# Patient Record
Sex: FEMALE | Race: WHITE | NOT HISPANIC OR LATINO
[De-identification: names, ages, dates, MRNs, and addresses within clinical notes are randomized per-mention and may not be internally consistent; named-entity substitution may affect disease eponyms.]

---

## 2019-10-23 PROBLEM — Z00.00 ENCOUNTER FOR PREVENTIVE HEALTH EXAMINATION: Status: ACTIVE | Noted: 2019-10-23

## 2019-10-29 ENCOUNTER — APPOINTMENT (OUTPATIENT)
Dept: ORTHOPEDIC SURGERY | Facility: CLINIC | Age: 69
End: 2019-10-29
Payer: MEDICARE

## 2019-10-29 VITALS
WEIGHT: 190 LBS | DIASTOLIC BLOOD PRESSURE: 79 MMHG | HEART RATE: 78 BPM | BODY MASS INDEX: 28.14 KG/M2 | HEIGHT: 69 IN | SYSTOLIC BLOOD PRESSURE: 128 MMHG

## 2019-10-29 DIAGNOSIS — M25.562 PAIN IN LEFT KNEE: ICD-10-CM

## 2019-10-29 DIAGNOSIS — M17.12 UNILATERAL PRIMARY OSTEOARTHRITIS, LEFT KNEE: ICD-10-CM

## 2019-10-29 PROCEDURE — 99203 OFFICE O/P NEW LOW 30 MIN: CPT

## 2019-10-29 PROCEDURE — 73562 X-RAY EXAM OF KNEE 3: CPT | Mod: LT

## 2019-10-29 PROCEDURE — 72170 X-RAY EXAM OF PELVIS: CPT | Mod: 59

## 2019-10-29 RX ORDER — ROSUVASTATIN CALCIUM 5 MG/1
TABLET, FILM COATED ORAL
Refills: 0 | Status: ACTIVE | COMMUNITY

## 2019-10-29 RX ORDER — LEVOTHYROXINE SODIUM 0.17 MG/1
TABLET ORAL
Refills: 0 | Status: ACTIVE | COMMUNITY

## 2020-02-19 ENCOUNTER — OUTPATIENT (OUTPATIENT)
Dept: OUTPATIENT SERVICES | Facility: HOSPITAL | Age: 70
LOS: 1 days | End: 2020-02-19
Payer: MEDICARE

## 2020-02-19 VITALS
WEIGHT: 187.39 LBS | RESPIRATION RATE: 15 BRPM | TEMPERATURE: 98 F | OXYGEN SATURATION: 99 % | DIASTOLIC BLOOD PRESSURE: 73 MMHG | HEIGHT: 68.5 IN | SYSTOLIC BLOOD PRESSURE: 109 MMHG | HEART RATE: 73 BPM

## 2020-02-19 DIAGNOSIS — Z90.89 ACQUIRED ABSENCE OF OTHER ORGANS: Chronic | ICD-10-CM

## 2020-02-19 DIAGNOSIS — Z01.818 ENCOUNTER FOR OTHER PREPROCEDURAL EXAMINATION: ICD-10-CM

## 2020-02-19 DIAGNOSIS — Z98.890 OTHER SPECIFIED POSTPROCEDURAL STATES: Chronic | ICD-10-CM

## 2020-02-19 DIAGNOSIS — M17.12 UNILATERAL PRIMARY OSTEOARTHRITIS, LEFT KNEE: ICD-10-CM

## 2020-02-19 DIAGNOSIS — M25.562 PAIN IN LEFT KNEE: ICD-10-CM

## 2020-02-19 LAB
ALBUMIN SERPL ELPH-MCNC: 3.8 G/DL — SIGNIFICANT CHANGE UP (ref 3.3–5)
ALP SERPL-CCNC: 63 U/L — SIGNIFICANT CHANGE UP (ref 30–120)
ALT FLD-CCNC: 21 U/L DA — SIGNIFICANT CHANGE UP (ref 10–60)
ANION GAP SERPL CALC-SCNC: 6 MMOL/L — SIGNIFICANT CHANGE UP (ref 5–17)
APTT BLD: 30.3 SEC — SIGNIFICANT CHANGE UP (ref 28.5–37)
AST SERPL-CCNC: 18 U/L — SIGNIFICANT CHANGE UP (ref 10–40)
BILIRUB SERPL-MCNC: 0.4 MG/DL — SIGNIFICANT CHANGE UP (ref 0.2–1.2)
BUN SERPL-MCNC: 16 MG/DL — SIGNIFICANT CHANGE UP (ref 7–23)
CALCIUM SERPL-MCNC: 9.1 MG/DL — SIGNIFICANT CHANGE UP (ref 8.4–10.5)
CHLORIDE SERPL-SCNC: 106 MMOL/L — SIGNIFICANT CHANGE UP (ref 96–108)
CO2 SERPL-SCNC: 30 MMOL/L — SIGNIFICANT CHANGE UP (ref 22–31)
CREAT SERPL-MCNC: 0.88 MG/DL — SIGNIFICANT CHANGE UP (ref 0.5–1.3)
GLUCOSE SERPL-MCNC: 100 MG/DL — HIGH (ref 70–99)
HCT VFR BLD CALC: 42 % — SIGNIFICANT CHANGE UP (ref 34.5–45)
HGB BLD-MCNC: 13.3 G/DL — SIGNIFICANT CHANGE UP (ref 11.5–15.5)
INR BLD: 0.98 RATIO — SIGNIFICANT CHANGE UP (ref 0.88–1.16)
MCHC RBC-ENTMCNC: 27.2 PG — SIGNIFICANT CHANGE UP (ref 27–34)
MCHC RBC-ENTMCNC: 31.7 GM/DL — LOW (ref 32–36)
MCV RBC AUTO: 85.9 FL — SIGNIFICANT CHANGE UP (ref 80–100)
MRSA PCR RESULT.: SIGNIFICANT CHANGE UP
NRBC # BLD: 0 /100 WBCS — SIGNIFICANT CHANGE UP (ref 0–0)
PLATELET # BLD AUTO: 242 K/UL — SIGNIFICANT CHANGE UP (ref 150–400)
POTASSIUM SERPL-MCNC: 4.6 MMOL/L — SIGNIFICANT CHANGE UP (ref 3.5–5.3)
POTASSIUM SERPL-SCNC: 4.6 MMOL/L — SIGNIFICANT CHANGE UP (ref 3.5–5.3)
PROT SERPL-MCNC: 7.6 G/DL — SIGNIFICANT CHANGE UP (ref 6–8.3)
PROTHROM AB SERPL-ACNC: 10.9 SEC — SIGNIFICANT CHANGE UP (ref 10–12.9)
RBC # BLD: 4.89 M/UL — SIGNIFICANT CHANGE UP (ref 3.8–5.2)
RBC # FLD: 14 % — SIGNIFICANT CHANGE UP (ref 10.3–14.5)
S AUREUS DNA NOSE QL NAA+PROBE: SIGNIFICANT CHANGE UP
SODIUM SERPL-SCNC: 142 MMOL/L — SIGNIFICANT CHANGE UP (ref 135–145)
WBC # BLD: 4.64 K/UL — SIGNIFICANT CHANGE UP (ref 3.8–10.5)
WBC # FLD AUTO: 4.64 K/UL — SIGNIFICANT CHANGE UP (ref 3.8–10.5)

## 2020-02-19 PROCEDURE — G0463: CPT

## 2020-02-19 PROCEDURE — 93005 ELECTROCARDIOGRAM TRACING: CPT

## 2020-02-19 PROCEDURE — 93010 ELECTROCARDIOGRAM REPORT: CPT

## 2020-02-19 NOTE — H&P PST ADULT - NSICDXPASTSURGICALHX_GEN_ALL_CORE_FT
PAST SURGICAL HISTORY:  S/P ear surgery left side age 23    S/P knee surgery meniscus 1 0n right, 2 on left    S/P tonsillectomy child

## 2020-02-19 NOTE — H&P PST ADULT - NSANTHOSAYNRD_GEN_A_CORE
No. TYSON screening performed.  STOP BANG Legend: 0-2 = LOW Risk; 3-4 = INTERMEDIATE Risk; 5-8 = HIGH Risk

## 2020-02-19 NOTE — H&P PST ADULT - HISTORY OF PRESENT ILLNESS
this is a 68 y/o female who has had left knee pain for about 10 months; has had gel and cortisone shots with relief for a period of time; tests show bone on bone; to have left knee replacement; takes ibuprofen for pain

## 2020-02-19 NOTE — H&P PST ADULT - NSICDXFAMILYHX_GEN_ALL_CORE_FT
FAMILY HISTORY:  FH: brain aneurysm, brother   FH: colon cancer, father   FH: heart failure, mother

## 2020-03-06 PROBLEM — E78.5 HYPERLIPIDEMIA, UNSPECIFIED: Chronic | Status: ACTIVE | Noted: 2020-02-19

## 2020-03-09 ENCOUNTER — RESULT REVIEW (OUTPATIENT)
Age: 70
End: 2020-03-09

## 2020-03-09 ENCOUNTER — TRANSCRIPTION ENCOUNTER (OUTPATIENT)
Age: 70
End: 2020-03-09

## 2020-03-09 ENCOUNTER — APPOINTMENT (OUTPATIENT)
Dept: ORTHOPEDIC SURGERY | Facility: HOSPITAL | Age: 70
End: 2020-03-09

## 2020-03-09 ENCOUNTER — INPATIENT (INPATIENT)
Facility: HOSPITAL | Age: 70
LOS: 1 days | Discharge: HOME | DRG: 470 | End: 2020-03-11
Attending: ORTHOPAEDIC SURGERY | Admitting: ORTHOPAEDIC SURGERY
Payer: MEDICARE

## 2020-03-09 VITALS
SYSTOLIC BLOOD PRESSURE: 124 MMHG | HEIGHT: 69 IN | RESPIRATION RATE: 13 BRPM | TEMPERATURE: 98 F | WEIGHT: 195.11 LBS | HEART RATE: 71 BPM | OXYGEN SATURATION: 100 % | DIASTOLIC BLOOD PRESSURE: 59 MMHG

## 2020-03-09 DIAGNOSIS — M17.12 UNILATERAL PRIMARY OSTEOARTHRITIS, LEFT KNEE: ICD-10-CM

## 2020-03-09 DIAGNOSIS — M25.562 PAIN IN LEFT KNEE: ICD-10-CM

## 2020-03-09 DIAGNOSIS — Z01.818 ENCOUNTER FOR OTHER PREPROCEDURAL EXAMINATION: ICD-10-CM

## 2020-03-09 DIAGNOSIS — Z90.89 ACQUIRED ABSENCE OF OTHER ORGANS: Chronic | ICD-10-CM

## 2020-03-09 DIAGNOSIS — Z98.890 OTHER SPECIFIED POSTPROCEDURAL STATES: Chronic | ICD-10-CM

## 2020-03-09 LAB
ANION GAP SERPL CALC-SCNC: 10 MMOL/L — SIGNIFICANT CHANGE UP (ref 5–17)
BUN SERPL-MCNC: 10 MG/DL — SIGNIFICANT CHANGE UP (ref 7–23)
CALCIUM SERPL-MCNC: 8.9 MG/DL — SIGNIFICANT CHANGE UP (ref 8.4–10.5)
CHLORIDE SERPL-SCNC: 107 MMOL/L — SIGNIFICANT CHANGE UP (ref 96–108)
CO2 SERPL-SCNC: 28 MMOL/L — SIGNIFICANT CHANGE UP (ref 22–31)
CREAT SERPL-MCNC: 1.01 MG/DL — SIGNIFICANT CHANGE UP (ref 0.5–1.3)
GLUCOSE SERPL-MCNC: 172 MG/DL — HIGH (ref 70–99)
HCT VFR BLD CALC: 38.6 % — SIGNIFICANT CHANGE UP (ref 34.5–45)
HGB BLD-MCNC: 12.2 G/DL — SIGNIFICANT CHANGE UP (ref 11.5–15.5)
MCHC RBC-ENTMCNC: 27.1 PG — SIGNIFICANT CHANGE UP (ref 27–34)
MCHC RBC-ENTMCNC: 31.6 GM/DL — LOW (ref 32–36)
MCV RBC AUTO: 85.8 FL — SIGNIFICANT CHANGE UP (ref 80–100)
NRBC # BLD: 0 /100 WBCS — SIGNIFICANT CHANGE UP (ref 0–0)
PLATELET # BLD AUTO: 227 K/UL — SIGNIFICANT CHANGE UP (ref 150–400)
POTASSIUM SERPL-MCNC: 3.9 MMOL/L — SIGNIFICANT CHANGE UP (ref 3.5–5.3)
POTASSIUM SERPL-SCNC: 3.9 MMOL/L — SIGNIFICANT CHANGE UP (ref 3.5–5.3)
RBC # BLD: 4.5 M/UL — SIGNIFICANT CHANGE UP (ref 3.8–5.2)
RBC # FLD: 13.9 % — SIGNIFICANT CHANGE UP (ref 10.3–14.5)
SODIUM SERPL-SCNC: 145 MMOL/L — SIGNIFICANT CHANGE UP (ref 135–145)
WBC # BLD: 8.79 K/UL — SIGNIFICANT CHANGE UP (ref 3.8–10.5)
WBC # FLD AUTO: 8.79 K/UL — SIGNIFICANT CHANGE UP (ref 3.8–10.5)

## 2020-03-09 PROCEDURE — 27447 TOTAL KNEE ARTHROPLASTY: CPT | Mod: LT

## 2020-03-09 PROCEDURE — 27447 TOTAL KNEE ARTHROPLASTY: CPT | Mod: AS,LT

## 2020-03-09 PROCEDURE — 88305 TISSUE EXAM BY PATHOLOGIST: CPT | Mod: 26

## 2020-03-09 PROCEDURE — 99223 1ST HOSP IP/OBS HIGH 75: CPT

## 2020-03-09 PROCEDURE — 73562 X-RAY EXAM OF KNEE 3: CPT | Mod: 26,LT

## 2020-03-09 PROCEDURE — 88311 DECALCIFY TISSUE: CPT | Mod: 26

## 2020-03-09 RX ORDER — ACETAMINOPHEN 500 MG
1000 TABLET ORAL ONCE
Refills: 0 | Status: COMPLETED | OUTPATIENT
Start: 2020-03-09 | End: 2020-03-09

## 2020-03-09 RX ORDER — TRANEXAMIC ACID 100 MG/ML
1000 INJECTION, SOLUTION INTRAVENOUS ONCE
Refills: 0 | Status: COMPLETED | OUTPATIENT
Start: 2020-03-09 | End: 2020-03-09

## 2020-03-09 RX ORDER — SODIUM CHLORIDE 9 MG/ML
1000 INJECTION, SOLUTION INTRAVENOUS
Refills: 0 | Status: DISCONTINUED | OUTPATIENT
Start: 2020-03-09 | End: 2020-03-09

## 2020-03-09 RX ORDER — OXYCODONE HYDROCHLORIDE 5 MG/1
5 TABLET ORAL
Refills: 0 | Status: DISCONTINUED | OUTPATIENT
Start: 2020-03-09 | End: 2020-03-10

## 2020-03-09 RX ORDER — ACETAMINOPHEN 500 MG
1000 TABLET ORAL EVERY 8 HOURS
Refills: 0 | Status: DISCONTINUED | OUTPATIENT
Start: 2020-03-10 | End: 2020-03-11

## 2020-03-09 RX ORDER — PANTOPRAZOLE SODIUM 20 MG/1
40 TABLET, DELAYED RELEASE ORAL
Refills: 0 | Status: DISCONTINUED | OUTPATIENT
Start: 2020-03-09 | End: 2020-03-11

## 2020-03-09 RX ORDER — ONDANSETRON 8 MG/1
4 TABLET, FILM COATED ORAL ONCE
Refills: 0 | Status: COMPLETED | OUTPATIENT
Start: 2020-03-09 | End: 2020-03-10

## 2020-03-09 RX ORDER — HYDROMORPHONE HYDROCHLORIDE 2 MG/ML
0.5 INJECTION INTRAMUSCULAR; INTRAVENOUS; SUBCUTANEOUS
Refills: 0 | Status: DISCONTINUED | OUTPATIENT
Start: 2020-03-09 | End: 2020-03-09

## 2020-03-09 RX ORDER — CHLORHEXIDINE GLUCONATE 213 G/1000ML
1 SOLUTION TOPICAL ONCE
Refills: 0 | Status: COMPLETED | OUTPATIENT
Start: 2020-03-09 | End: 2020-03-09

## 2020-03-09 RX ORDER — CEFAZOLIN SODIUM 1 G
2000 VIAL (EA) INJECTION EVERY 8 HOURS
Refills: 0 | Status: COMPLETED | OUTPATIENT
Start: 2020-03-09 | End: 2020-03-09

## 2020-03-09 RX ORDER — ASPIRIN/CALCIUM CARB/MAGNESIUM 324 MG
81 TABLET ORAL EVERY 12 HOURS
Refills: 0 | Status: DISCONTINUED | OUTPATIENT
Start: 2020-03-10 | End: 2020-03-11

## 2020-03-09 RX ORDER — PANTOPRAZOLE SODIUM 20 MG/1
1 TABLET, DELAYED RELEASE ORAL
Qty: 30 | Refills: 1
Start: 2020-03-09 | End: 2020-05-07

## 2020-03-09 RX ORDER — OXYCODONE HYDROCHLORIDE 5 MG/1
10 TABLET ORAL
Refills: 0 | Status: DISCONTINUED | OUTPATIENT
Start: 2020-03-09 | End: 2020-03-10

## 2020-03-09 RX ORDER — CEFAZOLIN SODIUM 1 G
2000 VIAL (EA) INJECTION ONCE
Refills: 0 | Status: COMPLETED | OUTPATIENT
Start: 2020-03-09 | End: 2020-03-09

## 2020-03-09 RX ORDER — CELECOXIB 200 MG/1
200 CAPSULE ORAL EVERY 12 HOURS
Refills: 0 | Status: DISCONTINUED | OUTPATIENT
Start: 2020-03-10 | End: 2020-03-11

## 2020-03-09 RX ORDER — HYDROMORPHONE HYDROCHLORIDE 2 MG/ML
0.5 INJECTION INTRAMUSCULAR; INTRAVENOUS; SUBCUTANEOUS
Refills: 0 | Status: DISCONTINUED | OUTPATIENT
Start: 2020-03-09 | End: 2020-03-11

## 2020-03-09 RX ORDER — ACETAMINOPHEN 500 MG
1000 TABLET ORAL EVERY 6 HOURS
Refills: 0 | Status: COMPLETED | OUTPATIENT
Start: 2020-03-09 | End: 2020-03-10

## 2020-03-09 RX ORDER — SENNA PLUS 8.6 MG/1
2 TABLET ORAL AT BEDTIME
Refills: 0 | Status: DISCONTINUED | OUTPATIENT
Start: 2020-03-09 | End: 2020-03-11

## 2020-03-09 RX ORDER — CELECOXIB 200 MG/1
1 CAPSULE ORAL
Qty: 60 | Refills: 0
Start: 2020-03-09 | End: 2020-04-07

## 2020-03-09 RX ORDER — CEFAZOLIN SODIUM 1 G
2000 VIAL (EA) INJECTION ONCE
Refills: 0 | Status: COMPLETED | OUTPATIENT
Start: 2020-03-10 | End: 2020-03-10

## 2020-03-09 RX ORDER — MAGNESIUM HYDROXIDE 400 MG/1
30 TABLET, CHEWABLE ORAL DAILY
Refills: 0 | Status: DISCONTINUED | OUTPATIENT
Start: 2020-03-09 | End: 2020-03-11

## 2020-03-09 RX ORDER — POLYETHYLENE GLYCOL 3350 17 G/17G
17 POWDER, FOR SOLUTION ORAL AT BEDTIME
Refills: 0 | Status: DISCONTINUED | OUTPATIENT
Start: 2020-03-09 | End: 2020-03-11

## 2020-03-09 RX ORDER — SODIUM CHLORIDE 9 MG/ML
1000 INJECTION, SOLUTION INTRAVENOUS
Refills: 0 | Status: DISCONTINUED | OUTPATIENT
Start: 2020-03-09 | End: 2020-03-10

## 2020-03-09 RX ORDER — ASPIRIN/CALCIUM CARB/MAGNESIUM 324 MG
1 TABLET ORAL
Qty: 82 | Refills: 0
Start: 2020-03-09 | End: 2020-04-18

## 2020-03-09 RX ORDER — ONDANSETRON 8 MG/1
4 TABLET, FILM COATED ORAL ONCE
Refills: 0 | Status: DISCONTINUED | OUTPATIENT
Start: 2020-03-09 | End: 2020-03-09

## 2020-03-09 RX ORDER — APREPITANT 80 MG/1
40 CAPSULE ORAL ONCE
Refills: 0 | Status: COMPLETED | OUTPATIENT
Start: 2020-03-09 | End: 2020-03-09

## 2020-03-09 RX ORDER — ACETAMINOPHEN 500 MG
2 TABLET ORAL
Qty: 0 | Refills: 0 | DISCHARGE
Start: 2020-03-09 | End: 2020-03-23

## 2020-03-09 RX ADMIN — Medication 1000 MILLIGRAM(S): at 21:40

## 2020-03-09 RX ADMIN — OXYCODONE HYDROCHLORIDE 5 MILLIGRAM(S): 5 TABLET ORAL at 19:35

## 2020-03-09 RX ADMIN — SODIUM CHLORIDE 125 MILLILITER(S): 9 INJECTION, SOLUTION INTRAVENOUS at 21:02

## 2020-03-09 RX ADMIN — Medication 400 MILLIGRAM(S): at 21:01

## 2020-03-09 RX ADMIN — Medication 400 MILLIGRAM(S): at 15:55

## 2020-03-09 RX ADMIN — SODIUM CHLORIDE 75 MILLILITER(S): 9 INJECTION, SOLUTION INTRAVENOUS at 11:30

## 2020-03-09 RX ADMIN — APREPITANT 40 MILLIGRAM(S): 80 CAPSULE ORAL at 07:07

## 2020-03-09 RX ADMIN — OXYCODONE HYDROCHLORIDE 5 MILLIGRAM(S): 5 TABLET ORAL at 20:05

## 2020-03-09 RX ADMIN — Medication 1000 MILLIGRAM(S): at 16:30

## 2020-03-09 RX ADMIN — Medication 100 MILLIGRAM(S): at 17:10

## 2020-03-09 RX ADMIN — SODIUM CHLORIDE 125 MILLILITER(S): 9 INJECTION, SOLUTION INTRAVENOUS at 15:54

## 2020-03-09 RX ADMIN — CHLORHEXIDINE GLUCONATE 1 APPLICATION(S): 213 SOLUTION TOPICAL at 07:07

## 2020-03-09 NOTE — PATIENT PROFILE ADULT - ...
OK to DC diflucan. OK to take tylenol as needed as long as it is no more than recommended on bottle. Hold off on triamcinolone cream for now (would only help if there was a rash). 09-Mar-2020 06:55:13

## 2020-03-09 NOTE — DISCHARGE NOTE PROVIDER - NSDCACTIVITY_GEN_ALL_CORE
Stairs allowed/Walking - Indoors allowed/Walking - Outdoors allowed/No heavy lifting/straining/Do not drive or operate machinery/Showering allowed

## 2020-03-09 NOTE — DISCHARGE NOTE PROVIDER - PROVIDER TOKENS
PROVIDER:[TOKEN:[[id number]],SCHEDULEDAPPT:[03/23/2020],SCHEDULEDAPPTTIME:[02:20 PM],ESTABLISHEDPATIENT:[T]]

## 2020-03-09 NOTE — DISCHARGE NOTE PROVIDER - NSDCCPCAREPLAN_GEN_ALL_CORE_FT
PRINCIPAL DISCHARGE DIAGNOSIS  Diagnosis: Primary osteoarthritis of left knee  Assessment and Plan of Treatment: For your total knee replacement:  Physical Therapy/Occupational Therapy for: ambulation, transfers, stairs, ADL's (activities of daily living), range of motion exercises, and isometrics  -Activity  • Weight Bearing as tolerated with rolling walker.  • Take short, frequent walks increasing the distance that you walk each day as tolerated.  • Change your position every hour to decrease pain and stiffness.  • Continue the exercises taught to you by your physical therapist.  • No driving until cleared by the doctor.  • No tub baths, hot tubs, or swimming pools until instructed by your doctor.  • Do not squat down on the floor.  • Do not kneel or twist your knee.  • Range of Motion Goals: Flexion= 120 degrees, Extension = 0 degrees  Keep incision clean and dry. May shower post-op if no drainage from incision.  Do not scrub incision site. Pat dry after shower.  Prineo removal 2 weeks after surgery at Surgeon's office.

## 2020-03-09 NOTE — DISCHARGE NOTE PROVIDER - NSDCCPTREATMENT_GEN_ALL_CORE_FT
PRINCIPAL PROCEDURE  Procedure: Left total knee replacement  Findings and Treatment: severe DJD left knee

## 2020-03-09 NOTE — PHYSICAL THERAPY INITIAL EVALUATION ADULT - GAIT TRAINING, PT EVAL
Pt will ambulate 150 feet with RW independently in 3-4 sesssions . Negotiate 15 steps with 1 handrail and cane independently WBAT  LE in 3-4 sessions

## 2020-03-09 NOTE — CONSULT NOTE ADULT - ASSESSMENT
Aftercare following surgery    pain meds dilaudid and oxycodone. Monitor for respiratory depression and lethargy.  PT/OT.  DVT prophylaxis.  DVT ppx: [ x]ASA81 [ ] KEQ007 [ ] Lovenox [ ] Coumadin   [ ] Eliquis [  ] Heparin  Dispo:     Home [ ]     Acute Rehab [ ]     ABBEY [ ]     TBD [x ]    Dyslipidemia  simvastatin.    Hypothyroidism  synthroid. Aftercare following left TKR 3/9    pain meds dilaudid and oxycodone. Monitor for respiratory depression and lethargy.  PT/OT.  DVT prophylaxis.  DVT ppx: [ x]ASA81 [ ] EQC554 [ ] Lovenox [ ] Coumadin   [ ] Eliquis [  ] Heparin  Dispo:     Home [ x]     Acute Rehab [ ]     ABBEY [ ]     TBD [ ]    Dyslipidemia  simvastatin.    Hypothyroidism  synthroid.    Plan of care was discuss with patient, all questions were answered, seems understand and in agreement.

## 2020-03-09 NOTE — BRIEF OPERATIVE NOTE - NSICDXBRIEFPREOP_GEN_ALL_CORE_FT
PRE-OP DIAGNOSIS:  Primary localized osteoarthritis of left knee 09-Mar-2020 11:13:56  eHrnando Beverly

## 2020-03-09 NOTE — DISCHARGE NOTE PROVIDER - CARE PROVIDER_API CALL
Alexei Fuentes)  Orthopaedic Surgery  [address], [address]  [place] [address]  Phone: [phone]  Fax: [phone]  Established Patient  Scheduled Appointment: 03/23/2020 02:20 PM

## 2020-03-09 NOTE — DISCHARGE NOTE PROVIDER - NSDCFUSCHEDAPPT_GEN_ALL_CORE_FT
BELLA NOLEN ; 03/23/2020 ; [address]  BELLA NOLEN ; 05/05/2020 ; [address] BELLA NOLEN ; 03/23/2020 ; [address]  BELLA NOLEN ; 05/05/2020 ; [address] BELLA NOLEN ; 03/23/2020 ; [address]  BELLA NOLEN ; 05/05/2020 ; [address]

## 2020-03-09 NOTE — PROGRESS NOTE ADULT - SUBJECTIVE AND OBJECTIVE BOX
Ortho PA Post Op Check    Procedure: Left TKR  Surgeon: MD Alfredo    Pt comfortable without complaints, pain controlled 3-4/10 on interval Rx; Denies CP, SOB, N/V, numbness/tingling   Tolerated PO fluids well. Spinal emerged. Ambulation attempted earlier with PT, limited due to block effect.     Pain Rx:  acetaminophen  IVPB .. 1000 milliGRAM(s) IV Intermittent every 6 hours  HYDROmorphone  Injectable 0.5 milliGRAM(s) IV Push every 3 hours PRN  oxyCODONE    IR 5 milliGRAM(s) Oral every 3 hours PRN  oxyCODONE    IR 10 milliGRAM(s) Oral every 3 hours PRN      General Exam:  Vital Signs Last 24 Hrs  T(C): 36.3 (03-09-20 @ 11:05), Max: 36.3 (03-09-20 @ 11:05)  T(F): 97.4 (03-09-20 @ 11:05), Max: 97.4 (03-09-20 @ 11:05)  HR: 76 (03-09-20 @ 13:35) (65 - 76)  BP: 121/73 (03-09-20 @ 13:35) (112/52 - 128/57)  BP(mean): --  RR: 18 (03-09-20 @ 13:35) (10 - 23)  SpO2: 99% (03-09-20 @ 13:35) (99% - 100%)    General: Pt Alert and oriented, NAD, controlled pain.  Ext(Knee): Left Knee Carmichael Dressing clean, dry, & intact; Not constricting at Proximal & Distal ends  Neuro/Vasc: Feet toes warm, pink. DP = 2+. No calf tenderness bilat. Has sensation over feet & toes bilat. Full AROM bilat feet & toes. EHL = 5/5  Urine Out [11P-7A] - voided  VTEP: On Venodynes Bilat; BID Ecotrin to start in AM  Hospitalist input noted    A/P: 69yFemale POD#0 s/p Left TKR   - Stable from surgical standpoint  - Pain Control stable  - DVT ppx: Ordered  - Post op abx: Ordered   - Post Op Labs pending draw; Ortho team/Hospitalist to F/U  - Weight bearing status: Fwb

## 2020-03-09 NOTE — PRE-OP CHECKLIST - BLOOD AVAILABLE
Problem: Diabetes, Type 2 (Adult)  Goal: Signs and Symptoms of Listed Potential Problems Will be Absent, Minimized or Managed (Diabetes, Type 2)  Signs and symptoms of listed potential problems will be absent, minimized or managed by discharge/transition of care (reference Diabetes, Type 2 (Adult) CPG).   Accuchecks continue every shift.     Problem: Patient Care Overview  Goal: Plan of Care Review  Outcome: Ongoing (interventions implemented as appropriate)  Plan of care reviewed with patient and he agrees with the plan of care. Telemetry monitoring continues. SCDs in use. Bishop intact. Catheter care provided. Acetaminophen effective for c/o headache.    Problem: Fall Risk (Adult)  Goal: Absence of Falls  Patient will demonstrate the desired outcomes by discharge/transition of care.   Outcome: Ongoing (interventions implemented as appropriate)  Fall precautions maintained. Bed alarm engaged at all times. Family at bedside.     Problem: Pressure Ulcer Risk (Ronaldo Scale) (Adult,Obstetrics,Pediatric)  Goal: Skin Integrity  Patient will demonstrate the desired outcomes by discharge/transition of care.   Outcome: Ongoing (interventions implemented as appropriate)  Frequent weight shifts encouraged.     Problem: Urinary Tract Infection (Adult)  Goal: Signs and Symptoms of Listed Potential Problems Will be Absent, Minimized or Managed (Urinary Tract Infection)  Signs and symptoms of listed potential problems will be absent, minimized or managed by discharge/transition of care (reference Urinary Tract Infection (Adult) CPG).   Afebrile. Denies dsyuria. Wife  At bedside.        n/a

## 2020-03-09 NOTE — PHYSICAL THERAPY INITIAL EVALUATION ADULT - ADDITIONAL COMMENTS
Pt lives in house with 4 steps to enter no steps inside. Owns a cane. Pt states  will be caregiver for pt upon d/c to home.

## 2020-03-09 NOTE — CONSULT NOTE ADULT - SUBJECTIVE AND OBJECTIVE BOX
Patient is a 69y old  Female who presents with a chief complaint of   this is a 70 y/o female who has had left knee pain for about 10 months; has had gel and cortisone shots with relief for a period of time; tests show bone on bone; to have left knee replacement; takes ibuprofen for pain  HPI:    PAST MEDICAL & SURGICAL HISTORY:  Osteoarthritis  Hyperlipemia  Hypothyroid  S/P ear surgery: left side age 23  S/P tonsillectomy: child  S/P knee surgery: meniscus 1 0n right, 2 on left    MEDICATIONS  (STANDING):  lactated ringers. 1000 milliLiter(s) (75 mL/Hr) IV Continuous <Continuous>    MEDICATIONS  (PRN):  HYDROmorphone  Injectable 0.5 milliGRAM(s) IV Push every 10 minutes PRN Moderate Pain (4 - 6)  ondansetron Injectable 4 milliGRAM(s) IV Push once PRN Nausea and/or Vomiting      Allergies    No Known Allergies    Intolerances    codeine (Nausea)    SOCIAL HISTORY:  Smoker:  YES / NO        PACK YEARS:                         WHEN QUIT?  ETOH use:  YES / NO               FREQUENCY / QUANTITY:  Ilicit Drug use:  YES / NO  Occupation:  Assisted device use (Cane / Walker):  Live with:      FAMILY HISTORY:  FH: brain aneurysm: brother   FH: heart failure: mother   FH: colon cancer: father       Vital Signs Last 24 Hrs  T(C): 36.3 (09 Mar 2020 11:05), Max: 36.8 (09 Mar 2020 07:09)  T(F): 97.4 (09 Mar 2020 11:05), Max: 98.3 (09 Mar 2020 07:09)  HR: 66 (09 Mar 2020 11:35) (66 - 73)  BP: 117/52 (09 Mar 2020 11:35) (113/62 - 124/59)  BP(mean): --  RR: 18 (09 Mar 2020 11:35) (13 - 23)  SpO2: 100% (09 Mar 2020 11:35) (99% - 100%) Patient is a 69y old  Female who presents with a chief complaint of   this is a 70 y/o female who has had left knee pain for about 10 months; has had gel and cortisone shots with relief for a period of time; tests show bone on bone; to have left knee replacement; takes ibuprofen for pain  HPI:    Patient is seen and examined.  Pain is controlled.    PAST MEDICAL & SURGICAL HISTORY:  Osteoarthritis  Hyperlipemia  Hypothyroid  S/P ear surgery: left side age 23  S/P tonsillectomy: child  S/P knee surgery: meniscus 1 0n right, 2 on left    MEDICATIONS  (STANDING):  lactated ringers. 1000 milliLiter(s) (75 mL/Hr) IV Continuous <Continuous>    MEDICATIONS  (PRN):  HYDROmorphone  Injectable 0.5 milliGRAM(s) IV Push every 10 minutes PRN Moderate Pain (4 - 6)  ondansetron Injectable 4 milliGRAM(s) IV Push once PRN Nausea and/or Vomiting      Allergies    No Known Allergies    Intolerances    codeine (Nausea)    SOCIAL HISTORY:  Smoker:  NO        PACK YEARS:                         WHEN QUIT?  ETOH use:  YES /social               FREQUENCY / QUANTITY:  Ilicit Drug use:   NO        FAMILY HISTORY:  FH: brain aneurysm: brother   FH: heart failure: mother   FH: colon cancer: father       Vital Signs Last 24 Hrs  T(C): 36.3 (09 Mar 2020 11:05), Max: 36.8 (09 Mar 2020 07:09)  T(F): 97.4 (09 Mar 2020 11:05), Max: 98.3 (09 Mar 2020 07:09)  HR: 66 (09 Mar 2020 11:35) (66 - 73)  BP: 117/52 (09 Mar 2020 11:35) (113/62 - 124/59)  BP(mean): --  RR: 18 (09 Mar 2020 11:35) (13 - 23)  SpO2: 100% (09 Mar 2020 11:35) (99% - 100%)

## 2020-03-09 NOTE — DISCHARGE NOTE PROVIDER - NSDCMRMEDTOKEN_GEN_ALL_CORE_FT
acetaminophen 500 mg oral tablet: 2 tab(s) orally every 8 hours  Advil 200 mg oral tablet: 3 tab(s) orally every 6 hours, As Needed  aspirin 81 mg oral delayed release tablet: 1 tab orally every 12 hours. Take 2 hours before Celebrex  celecoxib 200 mg oral capsule: 1 cap orally every 12 hours. Take 2 hours after   Aspirin  Crestor 5 mg oral tablet: 1 tab(s) orally once a day  pantoprazole 40 mg oral delayed release tablet: 1 tab orally once a day (before a meal)   Synthroid 25 mcg (0.025 mg) oral tablet: 1 tab(s) orally once a day 3:1 Commode: Dx: s/p Left TKR  acetaminophen 500 mg oral tablet: 2 tab(s) orally every 8 hours  aspirin 81 mg oral delayed release tablet: 1 tab orally every 12 hours. Take 2 hours before Celebrex  celecoxib 200 mg oral capsule: 1 cap orally every 12 hours. Take 2 hours after   Aspirin  Crestor 5 mg oral tablet: 1 tab(s) orally once a day  pantoprazole 40 mg oral delayed release tablet: 1 tab orally once a day (before a meal)   polyethylene glycol 3350 oral powder for reconstitution: 17 gram(s) orally once a day (at bedtime)  Rolling Walker with 5 inch wheels: Dx: s/p Left TKR  senna oral tablet: 2 tab(s) orally once a day (at bedtime)  Synthroid 25 mcg (0.025 mg) oral tablet: 1 tab(s) orally once a day  traMADol 50 mg oral tablet: 1 tab orally every 4 hours, As needed, Mod Pain; 2 tabs orally every 6 hours as needed for severe pain MDD:8   ref #[id number] 3:1 Commode: Dx: s/p Left TKR  acetaminophen 500 mg oral tablet: 2 tab(s) orally every 8 hours  aspirin 81 mg oral delayed release tablet: 1 tab orally every 12 hours. Take 2 hours before Celebrex  celecoxib 200 mg oral capsule: 1 cap orally every 12 hours. Take 2 hours after   Aspirin  Crestor 5 mg oral tablet: 1 tab(s) orally once a day  pantoprazole 40 mg oral delayed release tablet: 1 tab orally once a day (before a meal)   polyethylene glycol 3350 oral powder for reconstitution: 17 gram(s) orally once a day (at bedtime)  Rolling Walker with 5 inch wheels: Dx: s/p Left TKR  senna oral tablet: 2 tab(s) orally once a day (at bedtime)  Synthroid 25 mcg (0.025 mg) oral tablet: 1 tab(s) orally once a day  traMADol 50 mg oral tablet: 1 tab orally every 4 hours, As needed, Mod Pain; 2 tabs orally every 6 hours as needed for severe pain MDD:8   ref #[id number]

## 2020-03-09 NOTE — DISCHARGE NOTE PROVIDER - HOSPITAL COURSE
This patient was admitted to [facility] with a history of severe degenerative joint disease of the left knee.  Patient went to Pre-Surgical Testing at [facility] and was medically cleared to undergo elective procedure. Patient underwent Left TKR by Dr. Alexei Fuentes on 3/9/20. Procedure was well tolerated.  No operative or concepcion-operative complications arose during patients hospital course.  Patient received antibiotic according to SCIP guidelines for infection prevention.  Aspirin 81mg q 12h was given for DVT prophylaxis, in addition to the use of SCDs.  Anesthesia, Medical Hospitalist, Physical Therapy and Occupational Therapy were consulted. Patient is stable for discharge with a good prognosis.  Appropriate discharge instructions and medications are provided in this document.

## 2020-03-09 NOTE — PHYSICAL THERAPY INITIAL EVALUATION ADULT - ACTIVE RANGE OF MOTION EXAMINATION, REHAB EVAL
ayaka. upper extremity Active ROM was WNL (within normal limits)/deficits as listed below/Left LE knee flexion 0-50 degrees , left ankle foot WNL./RLE Active ROM was WNL (within normal limits)

## 2020-03-10 ENCOUNTER — TRANSCRIPTION ENCOUNTER (OUTPATIENT)
Age: 70
End: 2020-03-10

## 2020-03-10 LAB
ANION GAP SERPL CALC-SCNC: 7 MMOL/L — SIGNIFICANT CHANGE UP (ref 5–17)
BUN SERPL-MCNC: 10 MG/DL — SIGNIFICANT CHANGE UP (ref 7–23)
CALCIUM SERPL-MCNC: 8.9 MG/DL — SIGNIFICANT CHANGE UP (ref 8.4–10.5)
CHLORIDE SERPL-SCNC: 106 MMOL/L — SIGNIFICANT CHANGE UP (ref 96–108)
CO2 SERPL-SCNC: 28 MMOL/L — SIGNIFICANT CHANGE UP (ref 22–31)
CREAT SERPL-MCNC: 0.84 MG/DL — SIGNIFICANT CHANGE UP (ref 0.5–1.3)
GLUCOSE SERPL-MCNC: 109 MG/DL — HIGH (ref 70–99)
HCT VFR BLD CALC: 35.5 % — SIGNIFICANT CHANGE UP (ref 34.5–45)
HGB BLD-MCNC: 11.5 G/DL — SIGNIFICANT CHANGE UP (ref 11.5–15.5)
MCHC RBC-ENTMCNC: 27 PG — SIGNIFICANT CHANGE UP (ref 27–34)
MCHC RBC-ENTMCNC: 32.4 GM/DL — SIGNIFICANT CHANGE UP (ref 32–36)
MCV RBC AUTO: 83.3 FL — SIGNIFICANT CHANGE UP (ref 80–100)
NRBC # BLD: 0 /100 WBCS — SIGNIFICANT CHANGE UP (ref 0–0)
PLATELET # BLD AUTO: 227 K/UL — SIGNIFICANT CHANGE UP (ref 150–400)
POTASSIUM SERPL-MCNC: 4.1 MMOL/L — SIGNIFICANT CHANGE UP (ref 3.5–5.3)
POTASSIUM SERPL-SCNC: 4.1 MMOL/L — SIGNIFICANT CHANGE UP (ref 3.5–5.3)
RBC # BLD: 4.26 M/UL — SIGNIFICANT CHANGE UP (ref 3.8–5.2)
RBC # FLD: 14.2 % — SIGNIFICANT CHANGE UP (ref 10.3–14.5)
SODIUM SERPL-SCNC: 141 MMOL/L — SIGNIFICANT CHANGE UP (ref 135–145)
WBC # BLD: 8.78 K/UL — SIGNIFICANT CHANGE UP (ref 3.8–10.5)
WBC # FLD AUTO: 8.78 K/UL — SIGNIFICANT CHANGE UP (ref 3.8–10.5)

## 2020-03-10 PROCEDURE — 99233 SBSQ HOSP IP/OBS HIGH 50: CPT

## 2020-03-10 RX ORDER — TRAMADOL HYDROCHLORIDE 50 MG/1
50 TABLET ORAL EVERY 4 HOURS
Refills: 0 | Status: DISCONTINUED | OUTPATIENT
Start: 2020-03-10 | End: 2020-03-11

## 2020-03-10 RX ORDER — ATORVASTATIN CALCIUM 80 MG/1
20 TABLET, FILM COATED ORAL AT BEDTIME
Refills: 0 | Status: DISCONTINUED | OUTPATIENT
Start: 2020-03-10 | End: 2020-03-11

## 2020-03-10 RX ORDER — LEVOTHYROXINE SODIUM 125 MCG
25 TABLET ORAL DAILY
Refills: 0 | Status: DISCONTINUED | OUTPATIENT
Start: 2020-03-10 | End: 2020-03-11

## 2020-03-10 RX ORDER — TRAMADOL HYDROCHLORIDE 50 MG/1
100 TABLET ORAL EVERY 6 HOURS
Refills: 0 | Status: DISCONTINUED | OUTPATIENT
Start: 2020-03-10 | End: 2020-03-11

## 2020-03-10 RX ADMIN — OXYCODONE HYDROCHLORIDE 10 MILLIGRAM(S): 5 TABLET ORAL at 04:49

## 2020-03-10 RX ADMIN — OXYCODONE HYDROCHLORIDE 5 MILLIGRAM(S): 5 TABLET ORAL at 08:54

## 2020-03-10 RX ADMIN — OXYCODONE HYDROCHLORIDE 10 MILLIGRAM(S): 5 TABLET ORAL at 01:30

## 2020-03-10 RX ADMIN — Medication 25 MICROGRAM(S): at 05:52

## 2020-03-10 RX ADMIN — Medication 81 MILLIGRAM(S): at 05:52

## 2020-03-10 RX ADMIN — Medication 1000 MILLIGRAM(S): at 18:00

## 2020-03-10 RX ADMIN — TRAMADOL HYDROCHLORIDE 50 MILLIGRAM(S): 50 TABLET ORAL at 18:56

## 2020-03-10 RX ADMIN — Medication 1000 MILLIGRAM(S): at 03:48

## 2020-03-10 RX ADMIN — OXYCODONE HYDROCHLORIDE 10 MILLIGRAM(S): 5 TABLET ORAL at 00:59

## 2020-03-10 RX ADMIN — OXYCODONE HYDROCHLORIDE 10 MILLIGRAM(S): 5 TABLET ORAL at 04:19

## 2020-03-10 RX ADMIN — PANTOPRAZOLE SODIUM 40 MILLIGRAM(S): 20 TABLET, DELAYED RELEASE ORAL at 05:52

## 2020-03-10 RX ADMIN — OXYCODONE HYDROCHLORIDE 5 MILLIGRAM(S): 5 TABLET ORAL at 09:25

## 2020-03-10 RX ADMIN — Medication 81 MILLIGRAM(S): at 17:27

## 2020-03-10 RX ADMIN — Medication 100 MILLIGRAM(S): at 00:43

## 2020-03-10 RX ADMIN — SENNA PLUS 2 TABLET(S): 8.6 TABLET ORAL at 21:06

## 2020-03-10 RX ADMIN — CELECOXIB 200 MILLIGRAM(S): 200 CAPSULE ORAL at 21:06

## 2020-03-10 RX ADMIN — Medication 1000 MILLIGRAM(S): at 10:45

## 2020-03-10 RX ADMIN — CELECOXIB 200 MILLIGRAM(S): 200 CAPSULE ORAL at 09:25

## 2020-03-10 RX ADMIN — CELECOXIB 200 MILLIGRAM(S): 200 CAPSULE ORAL at 21:40

## 2020-03-10 RX ADMIN — ONDANSETRON 4 MILLIGRAM(S): 8 TABLET, FILM COATED ORAL at 13:54

## 2020-03-10 RX ADMIN — TRAMADOL HYDROCHLORIDE 50 MILLIGRAM(S): 50 TABLET ORAL at 19:26

## 2020-03-10 RX ADMIN — SODIUM CHLORIDE 125 MILLILITER(S): 9 INJECTION, SOLUTION INTRAVENOUS at 05:52

## 2020-03-10 RX ADMIN — Medication 1000 MILLIGRAM(S): at 17:27

## 2020-03-10 RX ADMIN — CELECOXIB 200 MILLIGRAM(S): 200 CAPSULE ORAL at 08:54

## 2020-03-10 RX ADMIN — Medication 400 MILLIGRAM(S): at 03:11

## 2020-03-10 RX ADMIN — TRAMADOL HYDROCHLORIDE 50 MILLIGRAM(S): 50 TABLET ORAL at 23:32

## 2020-03-10 RX ADMIN — Medication 1000 MILLIGRAM(S): at 11:14

## 2020-03-10 RX ADMIN — ATORVASTATIN CALCIUM 20 MILLIGRAM(S): 80 TABLET, FILM COATED ORAL at 21:06

## 2020-03-10 NOTE — DISCHARGE NOTE NURSING/CASE MANAGEMENT/SOCIAL WORK - NSSCNAMETXT_GEN_ALL_CORE
[facility]   Please contact the home care agency if you have not heard from them by 12 noon on the day after your hospital discharge.   Nurse to visit the day after hospital discharge; Rehabilitation Therapists to follow

## 2020-03-10 NOTE — PROGRESS NOTE ADULT - SUBJECTIVE AND OBJECTIVE BOX
Ortho PA - Post Op Check - S/P Left TKR          Pt alert and comfortable with no complaints, pain controlled  Denies nausea     Vital Signs Last 24 Hrs  T(C): 36.4 (03-10-20 @ 03:34), Max: 36.4 (03-10-20 @ 03:34)  T(F): 97.6 (03-10-20 @ 03:34), Max: 97.6 (03-10-20 @ 03:34)  HR: 70 (03-10-20 @ 03:34) (70 - 70)  BP: 112/70 (03-10-20 @ 03:34) (112/70 - 112/70)  BP(mean): --  RR: 16 (03-10-20 @ 03:34) (16 - 16)  SpO2: 97% (03-10-20 @ 03:34) (97% - 97%)  I&O's Detail    09 Mar 2020 07:01  -  10 Mar 2020 07:00  --------------------------------------------------------  IN:    IV PiggyBack: 400 mL    lactated ringers.: 1875 mL    lactated ringers.: 1600 mL  Total IN: 3875 mL    OUT:    Estimated Blood Loss: 150 mL    Voided: 2000 mL  Total OUT: 2150 mL    Total NET: 1725 mL        I&O's Summary    09 Mar 2020 07:01  -  10 Mar 2020 07:00  --------------------------------------------------------  IN: 3875 mL / OUT: 2150 mL / NET: 1725 mL                                12.2   8.79  )-----------( 227      ( 09 Mar 2020 17:24 )             38.6        03-09    145  |  107  |  10  ----------------------------<  172<H>  3.9   |  28  |  1.01    Ca    8.9      09 Mar 2020 17:24      MEDICATIONS:acetaminophen   Tablet .. 1000 milliGRAM(s) Oral every 8 hours  aspirin enteric coated 81 milliGRAM(s) Oral every 12 hours  bisacodyl Suppository 10 milliGRAM(s) Rectal daily PRN  celecoxib 200 milliGRAM(s) Oral every 12 hours  HYDROmorphone  Injectable 0.5 milliGRAM(s) IV Push every 3 hours PRN  levothyroxine 25 MICROGram(s) Oral daily  magnesium hydroxide Suspension 30 milliLiter(s) Oral daily PRN  ondansetron Injectable 4 milliGRAM(s) IV Push once  oxyCODONE    IR 5 milliGRAM(s) Oral every 3 hours PRN  oxyCODONE    IR 10 milliGRAM(s) Oral every 3 hours PRN  pantoprazole    Tablet 40 milliGRAM(s) Oral before breakfast  polyethylene glycol 3350 17 Gram(s) Oral at bedtime  senna 2 Tablet(s) Oral at bedtime    Anticoagulation:  aspirin enteric coated 81 milliGRAM(s) Oral every 12 hours      Antibiotics: complete      Pain medications:   acetaminophen   Tablet .. 1000 milliGRAM(s) Oral every 8 hours  celecoxib 200 milliGRAM(s) Oral every 12 hours  HYDROmorphone  Injectable 0.5 milliGRAM(s) IV Push every 3 hours PRN  ondansetron Injectable 4 milliGRAM(s) IV Push once  oxyCODONE    IR 5 milliGRAM(s) Oral every 3 hours PRN  oxyCODONE    IR 10 milliGRAM(s) Oral every 3 hours PRN          PE:  Left Knee-primary surgical bandage dry and intact.   Feet mobile and sensate.  *EHLs/ant.tibs. 5/5  PAS on LE's.  Calves soft and nontender.    A/P: Ortho stable post-op  - Continue post-op orders; pain management with above plan  - CBC stable today; REcheck tomorrow; check BMP today (in testing)  - DVT prevention with 6 weeks of Aspirin 81mg po every 12 hlurs  - PT /OT for advance ambulation and ROM left knee  - Medical care to be continued with Dr. Perdomo  -Discharge planning for Home tomorrow.  -Will continue to monitor closely with attendings. Ortho PA - Post Op Check - S/P Left TKR          Pt alert and comfortable with no complaints, pain controlled  Denies nausea     Vital Signs Last 24 Hrs  T(C): 36.4 (03-10-20 @ 03:34), Max: 36.4 (03-10-20 @ 03:34)  T(F): 97.6 (03-10-20 @ 03:34), Max: 97.6 (03-10-20 @ 03:34)  HR: 70 (03-10-20 @ 03:34) (70 - 70)  BP: 112/70 (03-10-20 @ 03:34) (112/70 - 112/70)  BP(mean): --  RR: 16 (03-10-20 @ 03:34) (16 - 16)  SpO2: 97% (03-10-20 @ 03:34) (97% - 97%)  I&O's Detail    09 Mar 2020 07:01  -  10 Mar 2020 07:00  --------------------------------------------------------  IN:    IV PiggyBack: 400 mL    lactated ringers.: 1875 mL    lactated ringers.: 1600 mL  Total IN: 3875 mL    OUT:    Estimated Blood Loss: 150 mL in OR yesterday    Voided: 2000 mL  Total OUT: 2150 mL    Total NET: 1725 mL        I&O's Summary    09 Mar 2020 07:01  -  10 Mar 2020 07:00  --------------------------------------------------------  IN: 3875 mL / OUT: 2150 mL / NET: 1725 mL                                12.2   8.79  )-----------( 227      ( 09 Mar 2020 17:24 )             38.6        03-09    145  |  107  |  10  ----------------------------<  172<H>  3.9   |  28  |  1.01    Ca    8.9      09 Mar 2020 17:24      MEDICATIONS:acetaminophen   Tablet .. 1000 milliGRAM(s) Oral every 8 hours  aspirin enteric coated 81 milliGRAM(s) Oral every 12 hours  bisacodyl Suppository 10 milliGRAM(s) Rectal daily PRN  celecoxib 200 milliGRAM(s) Oral every 12 hours  HYDROmorphone  Injectable 0.5 milliGRAM(s) IV Push every 3 hours PRN  levothyroxine 25 MICROGram(s) Oral daily  magnesium hydroxide Suspension 30 milliLiter(s) Oral daily PRN  ondansetron Injectable 4 milliGRAM(s) IV Push once  oxyCODONE    IR 5 milliGRAM(s) Oral every 3 hours PRN  oxyCODONE    IR 10 milliGRAM(s) Oral every 3 hours PRN  pantoprazole    Tablet 40 milliGRAM(s) Oral before breakfast  polyethylene glycol 3350 17 Gram(s) Oral at bedtime  senna 2 Tablet(s) Oral at bedtime    Anticoagulation:  aspirin enteric coated 81 milliGRAM(s) Oral every 12 hours      Antibiotics: complete      Pain medications:   acetaminophen   Tablet .. 1000 milliGRAM(s) Oral every 8 hours  celecoxib 200 milliGRAM(s) Oral every 12 hours  HYDROmorphone  Injectable 0.5 milliGRAM(s) IV Push every 3 hours PRN  ondansetron Injectable 4 milliGRAM(s) IV Push once  oxyCODONE    IR 5 milliGRAM(s) Oral every 3 hours PRN  oxyCODONE    IR 10 milliGRAM(s) Oral every 3 hours PRN          PE:  Left Knee-primary surgical bandage dry and intact.   Feet mobile and sensate.  *EHLs/ant.tibs. 5/5  PAS on LE's.  Calves soft and nontender.    A/P: Ortho stable post-op  - Continue post-op orders; pain management with above plan  - CBC stable today; REcheck tomorrow; check BMP today (in testing)  - DVT prevention with 6 weeks of Aspirin 81mg po every 12 hlurs  - PT /OT for advance ambulation and ROM left knee  - Medical care to be continued with Dr. Perdomo  -Discharge planning for Home tomorrow.  -Will continue to monitor closely with attendings.

## 2020-03-10 NOTE — PROGRESS NOTE ADULT - SUBJECTIVE AND OBJECTIVE BOX
Patient is a 69y old  Female who presents with a chief complaint of S/P Left TKR 3/9 (09 Mar 2020 15:46)      INTERVAL HPI/OVERNIGHT EVENTS:  Pt is seen and examined.  Pain is controlled.    Pain Location & Control:     MEDICATIONS  (STANDING):  acetaminophen   Tablet .. 1000 milliGRAM(s) Oral every 8 hours  aspirin enteric coated 81 milliGRAM(s) Oral every 12 hours  celecoxib 200 milliGRAM(s) Oral every 12 hours  levothyroxine 25 MICROGram(s) Oral daily  pantoprazole    Tablet 40 milliGRAM(s) Oral before breakfast  polyethylene glycol 3350 17 Gram(s) Oral at bedtime  senna 2 Tablet(s) Oral at bedtime    MEDICATIONS  (PRN):  bisacodyl Suppository 10 milliGRAM(s) Rectal daily PRN Constipation  HYDROmorphone  Injectable 0.5 milliGRAM(s) IV Push every 3 hours PRN Severe Pain (7 - 10)  magnesium hydroxide Suspension 30 milliLiter(s) Oral daily PRN Constipation  oxyCODONE    IR 5 milliGRAM(s) Oral every 3 hours PRN Mild Pain (1 - 3)  oxyCODONE    IR 10 milliGRAM(s) Oral every 3 hours PRN Moderate Pain (4 - 6)      Allergies    No Known Allergies    Intolerances    codeine (Nausea)      Vital Signs Last 24 Hrs  T(C): 36.4 (10 Mar 2020 11:26), Max: 36.6 (09 Mar 2020 19:00)  T(F): 97.5 (10 Mar 2020 11:26), Max: 97.9 (09 Mar 2020 19:00)  HR: 67 (10 Mar 2020 11:26) (64 - 72)  BP: 101/67 (10 Mar 2020 11:26) (98/62 - 123/58)  BP(mean): --  RR: 16 (10 Mar 2020 11:26) (16 - 16)  SpO2: 95% (10 Mar 2020 11:26) (95% - 100%)        LABS:                        11.5   8.78  )-----------( 227      ( 10 Mar 2020 07:16 )             35.5     10 Mar 2020 07:16    141    |  106    |  10     ----------------------------<  109    4.1     |  28     |  0.84     Ca    8.9        10 Mar 2020 07:16          CAPILLARY BLOOD GLUCOSE            Cultures          RADIOLOGY & ADDITIONAL TESTS:    Imaging Personally Reviewed:  [ ] YES  [ ] NO    Consultant(s) Notes Reviewed:  [ ] YES  [ ] NO    Care Discussed with Consultants/Other Providers [x ] YES  [ ] NO

## 2020-03-10 NOTE — DISCHARGE NOTE NURSING/CASE MANAGEMENT/SOCIAL WORK - PATIENT PORTAL LINK FT
You can access the FollowMyHealth Patient Portal offered by [facility] by registering at the following website: [email or web address]. By joining [employer]’s FollowMyHealth portal, you will also be able to view your health information using other applications (apps) compatible with our system.

## 2020-03-11 VITALS
HEART RATE: 73 BPM | TEMPERATURE: 98 F | RESPIRATION RATE: 16 BRPM | SYSTOLIC BLOOD PRESSURE: 111 MMHG | OXYGEN SATURATION: 95 % | DIASTOLIC BLOOD PRESSURE: 72 MMHG

## 2020-03-11 LAB
ANION GAP SERPL CALC-SCNC: 8 MMOL/L — SIGNIFICANT CHANGE UP (ref 5–17)
BUN SERPL-MCNC: 11 MG/DL — SIGNIFICANT CHANGE UP (ref 7–23)
CALCIUM SERPL-MCNC: 8.6 MG/DL — SIGNIFICANT CHANGE UP (ref 8.4–10.5)
CHLORIDE SERPL-SCNC: 106 MMOL/L — SIGNIFICANT CHANGE UP (ref 96–108)
CO2 SERPL-SCNC: 28 MMOL/L — SIGNIFICANT CHANGE UP (ref 22–31)
CREAT SERPL-MCNC: 0.76 MG/DL — SIGNIFICANT CHANGE UP (ref 0.5–1.3)
GLUCOSE SERPL-MCNC: 90 MG/DL — SIGNIFICANT CHANGE UP (ref 70–99)
HCT VFR BLD CALC: 34.3 % — LOW (ref 34.5–45)
HGB BLD-MCNC: 11.2 G/DL — LOW (ref 11.5–15.5)
MCHC RBC-ENTMCNC: 27.1 PG — SIGNIFICANT CHANGE UP (ref 27–34)
MCHC RBC-ENTMCNC: 32.7 GM/DL — SIGNIFICANT CHANGE UP (ref 32–36)
MCV RBC AUTO: 82.9 FL — SIGNIFICANT CHANGE UP (ref 80–100)
NRBC # BLD: 0 /100 WBCS — SIGNIFICANT CHANGE UP (ref 0–0)
PLATELET # BLD AUTO: 212 K/UL — SIGNIFICANT CHANGE UP (ref 150–400)
POTASSIUM SERPL-MCNC: 3.6 MMOL/L — SIGNIFICANT CHANGE UP (ref 3.5–5.3)
POTASSIUM SERPL-SCNC: 3.6 MMOL/L — SIGNIFICANT CHANGE UP (ref 3.5–5.3)
RBC # BLD: 4.14 M/UL — SIGNIFICANT CHANGE UP (ref 3.8–5.2)
RBC # FLD: 14.3 % — SIGNIFICANT CHANGE UP (ref 10.3–14.5)
SODIUM SERPL-SCNC: 142 MMOL/L — SIGNIFICANT CHANGE UP (ref 135–145)
WBC # BLD: 6.65 K/UL — SIGNIFICANT CHANGE UP (ref 3.8–10.5)
WBC # FLD AUTO: 6.65 K/UL — SIGNIFICANT CHANGE UP (ref 3.8–10.5)

## 2020-03-11 PROCEDURE — 73562 X-RAY EXAM OF KNEE 3: CPT

## 2020-03-11 PROCEDURE — 88305 TISSUE EXAM BY PATHOLOGIST: CPT

## 2020-03-11 PROCEDURE — 97530 THERAPEUTIC ACTIVITIES: CPT

## 2020-03-11 PROCEDURE — C1713: CPT

## 2020-03-11 PROCEDURE — 97165 OT EVAL LOW COMPLEX 30 MIN: CPT

## 2020-03-11 PROCEDURE — 36415 COLL VENOUS BLD VENIPUNCTURE: CPT

## 2020-03-11 PROCEDURE — 99239 HOSP IP/OBS DSCHRG MGMT >30: CPT

## 2020-03-11 PROCEDURE — C1889: CPT

## 2020-03-11 PROCEDURE — 97161 PT EVAL LOW COMPLEX 20 MIN: CPT

## 2020-03-11 PROCEDURE — 80048 BASIC METABOLIC PNL TOTAL CA: CPT

## 2020-03-11 PROCEDURE — 88311 DECALCIFY TISSUE: CPT

## 2020-03-11 PROCEDURE — 85027 COMPLETE CBC AUTOMATED: CPT

## 2020-03-11 PROCEDURE — C1776: CPT

## 2020-03-11 PROCEDURE — 97535 SELF CARE MNGMENT TRAINING: CPT

## 2020-03-11 PROCEDURE — 97110 THERAPEUTIC EXERCISES: CPT

## 2020-03-11 PROCEDURE — 97116 GAIT TRAINING THERAPY: CPT

## 2020-03-11 RX ORDER — POLYETHYLENE GLYCOL 3350 17 G/17G
17 POWDER, FOR SOLUTION ORAL
Qty: 0 | Refills: 0 | DISCHARGE
Start: 2020-03-11

## 2020-03-11 RX ORDER — IBUPROFEN 200 MG
3 TABLET ORAL
Qty: 0 | Refills: 0 | DISCHARGE

## 2020-03-11 RX ORDER — SENNA PLUS 8.6 MG/1
2 TABLET ORAL
Qty: 0 | Refills: 0 | DISCHARGE
Start: 2020-03-11

## 2020-03-11 RX ORDER — TRAMADOL HYDROCHLORIDE 50 MG/1
1 TABLET ORAL
Qty: 42 | Refills: 0
Start: 2020-03-11 | End: 2020-03-17

## 2020-03-11 RX ADMIN — PANTOPRAZOLE SODIUM 40 MILLIGRAM(S): 20 TABLET, DELAYED RELEASE ORAL at 05:51

## 2020-03-11 RX ADMIN — Medication 25 MICROGRAM(S): at 05:51

## 2020-03-11 RX ADMIN — Medication 1000 MILLIGRAM(S): at 03:36

## 2020-03-11 RX ADMIN — Medication 1000 MILLIGRAM(S): at 09:04

## 2020-03-11 RX ADMIN — TRAMADOL HYDROCHLORIDE 50 MILLIGRAM(S): 50 TABLET ORAL at 03:35

## 2020-03-11 RX ADMIN — CELECOXIB 200 MILLIGRAM(S): 200 CAPSULE ORAL at 09:05

## 2020-03-11 RX ADMIN — Medication 81 MILLIGRAM(S): at 05:51

## 2020-03-11 RX ADMIN — Medication 1000 MILLIGRAM(S): at 03:05

## 2020-03-11 RX ADMIN — TRAMADOL HYDROCHLORIDE 50 MILLIGRAM(S): 50 TABLET ORAL at 12:49

## 2020-03-11 RX ADMIN — CELECOXIB 200 MILLIGRAM(S): 200 CAPSULE ORAL at 09:06

## 2020-03-11 RX ADMIN — Medication 1000 MILLIGRAM(S): at 09:05

## 2020-03-11 RX ADMIN — TRAMADOL HYDROCHLORIDE 50 MILLIGRAM(S): 50 TABLET ORAL at 03:05

## 2020-03-11 RX ADMIN — TRAMADOL HYDROCHLORIDE 50 MILLIGRAM(S): 50 TABLET ORAL at 00:02

## 2020-03-11 NOTE — PROGRESS NOTE ADULT - ASSESSMENT
POD#2 s/p left TKR 3/9  - Pain controlled  - Bowel regimen  - PT/OT  - VTE PPx - ASA BID  - stable for discharge from medical perspective    Dyslipidemia  simvastatin.    Hypothyroidism  synthroid.
Aftercare following left TKR 3/9    pain meds dilaudid and oxycodone. Monitor for respiratory depression and lethargy.  PT/OT.  DVT prophylaxis.  DVT ppx: [ x]ASA81 [ ] SLY254 [ ] Lovenox [ ] Coumadin   [ ] Eliquis [  ] Heparin  Dispo:     Home [ x]     Acute Rehab [ ]     ABBEY [ ]     TBD [ ]    Dyslipidemia  simvastatin.    Hypothyroidism  synthroid.    Plan of care was discuss with patient, all questions were answered, seems understand and in agreement.

## 2020-03-11 NOTE — PROGRESS NOTE ADULT - SUBJECTIVE AND OBJECTIVE BOX
POD#: 2   S/P: Left TKR                       SUBJECTIVE: Patient seen and examined. Was up with PT.  Dressed and ready for discharge home. Pain well controlled with Tramadol.   Reported Pain Score = 1    OBJECTIVE:     Vital Signs Last 24 Hrs  T(C): 36.5 (11 Mar 2020 07:43), Max: 36.9 (10 Mar 2020 19:12)  T(F): 97.7 (11 Mar 2020 07:43), Max: 98.5 (10 Mar 2020 23:30)  HR: 78 (11 Mar 2020 07:43) (67 - 83)  BP: 110/57 (11 Mar 2020 09:12) (101/67 - 146/74)  RR: 18 (11 Mar 2020 07:43) (16 - 18)  SpO2: 94% (11 Mar 2020 07:43) (94% - 98%)    Left Knee:          Dressing removed by MADELEINE Causey earlier this am: incision clean/dry/intact, prineo tape in place  Bilateral LEs:         Sensation:  intact to light touch          Motor exam:  5/5 dorsiflexion/plantarflexion/EHL          2+ DP pulses          calf supple, NT         SCDs in place    LABS:                        11.2   6.65  )-----------( 212      ( 11 Mar 2020 07:13 )             34.3     03-11    142  |  106  |  11  ----------------------------<  90  3.6   |  28  |  0.76    Ca    8.6      11 Mar 2020 07:13            MEDICATIONS:  Anticoagulation:  aspirin enteric coated 81 milliGRAM(s) Oral every 12 hours      Pain medications:   acetaminophen   Tablet .. 1000 milliGRAM(s) Oral every 8 hours  celecoxib 200 milliGRAM(s) Oral every 12 hours  HYDROmorphone  Injectable 0.5 milliGRAM(s) IV Push every 3 hours PRN  traMADol 50 milliGRAM(s) Oral every 4 hours PRN  traMADol 100 milliGRAM(s) Oral every 6 hours PRN        A/P : Patient stable s/p Left TKR POD # 2  -    Pain control  -    DVT ppx: Aspirin 81mg q 12h  -    Weight bearing status: WBAT   -    Physical Therapy  -    Occupational Therapy  -    Discharge plan: Home today

## 2020-03-11 NOTE — PROGRESS NOTE ADULT - REASON FOR ADMISSION
S/P Left TKR 3/9
Patient is a 69y old  Female who presents with a chief complaint of S/P Left TKR 3/9 (09 Mar 2020 15:46)

## 2020-03-11 NOTE — PROGRESS NOTE ADULT - SUBJECTIVE AND OBJECTIVE BOX
INTERVAL HPI/OVERNIGHT EVENTS:   Patient seen and examined.  Feeling well, eating, voiding, + flatus, no BM yet.  No fevers, chills, sweats, dizziness, HA, changes in vision, cp, palpitations, sob, persistent cough, n/v/d, abd pain, dysuria, focal weakness, or calf pain.        REVIEW OF SYSTEMS:  See HPI,  all others negative    PHYSICAL EXAM:  Vital Signs Last 24 Hrs  T(C): 36.5 (11 Mar 2020 07:43), Max: 36.9 (10 Mar 2020 19:12)  T(F): 97.7 (11 Mar 2020 07:43), Max: 98.5 (10 Mar 2020 23:30)  HR: 78 (11 Mar 2020 07:43) (67 - 83)  BP: 110/57 (11 Mar 2020 09:18) (101/67 - 146/74)  BP(mean): --  RR: 18 (11 Mar 2020 07:43) (16 - 18)  SpO2: 94% (11 Mar 2020 07:43) (94% - 98%)    GENERAL: NAD, well-groomed, well-developed, awake, alert, oriented x 3, fluent and coherent speech  EYES: EOMI, PERRLA, conjunctiva and sclera clear  ENMT: No tonsillar erythema, exudates, or enlargement; Moist mucous membranes, Good dentition, No lesions  NECK: Supple, No JVD, No Cervical LAD, No thyromegaly, No thyroid nodules felt  NERVOUS SYSTEM:  Good concentration; Moving all 4 extremities against gravity and resistance; No gross sensory deficits, No facial droop  CHEST/LUNG: Clear to auscultation bilaterally; No rales, rhonchi, wheezing, or rubs  HEART: Regular rate and rhythm; No murmurs, rubs, or gallops  ABDOMEN: Soft, Nontender, Nondistended, Bowel sounds present, No palpable masses or organomegaly, No bruits  EXTREMITIES:  2+ Peripheral Pulses, No clubbing, cyanosis, or edema, no calf tenderness in either leg    Diagnostic Testin.2   6.65  )-----------( 212      ( 11 Mar 2020 07:13 )             34.3     11 Mar 2020 07:13    142    |  106    |  11     ----------------------------<  90     3.6     |  28     |  0.76     Ca    8.6        11 Mar 2020 07:13

## 2020-03-11 NOTE — PHARMACOTHERAPY INTERVENTION NOTE - COMMENTS
Transition of Care education at bedside - medication calendar given to patient. Pharmacy fill confirmed.
Admission medication reconciliation POD1

## 2020-03-23 ENCOUNTER — APPOINTMENT (OUTPATIENT)
Dept: ORTHOPEDIC SURGERY | Facility: CLINIC | Age: 70
End: 2020-03-23
Payer: MEDICARE

## 2020-03-23 PROCEDURE — 73562 X-RAY EXAM OF KNEE 3: CPT | Mod: LT

## 2020-03-23 PROCEDURE — 99024 POSTOP FOLLOW-UP VISIT: CPT

## 2020-03-23 RX ORDER — TRAMADOL HYDROCHLORIDE 50 MG/1
50 TABLET, COATED ORAL
Qty: 40 | Refills: 0 | Status: ACTIVE | COMMUNITY
Start: 2020-03-23 | End: 1900-01-01

## 2020-03-25 NOTE — PHYSICAL EXAM
[Normal] : Gait: normal [Cane] : ambulates with cane [LE] : Sensory: Intact in bilateral lower extremities [ALL] : dorsalis pedis, posterior tibial, femoral, popliteal, and radial 2+ and symmetric bilaterally [de-identified] : On physical examination of the left knee. Patient is status post left total knee replacement. There is a well-healed surgical scar with no evidence of infection superficial or deep. There is no redness swelling or discharge noted. Cranial is in place. It was removed without incident. The overall alignment is within neutral position. There is no evidence of ligamentous laxity. This was tested with anterior-posterior drawer test as well as varus and valgus stress testing. She has good range of motion. She is able to fully extend the knee and flex the knee to approximately 95° plus. There is no evidence of any muscle atrophy. No evidence of any motor or sensory deficit. Patient does have good distal pulses with no calf tenderness. [de-identified] : X-rays of the left knee reveals status post left total knee replacement. The hardware is in place and shows excellent alignment as well as fixation. There is no evidence of any fracture dislocation or loosening of any hardware.

## 2020-03-25 NOTE — DISCUSSION/SUMMARY
[de-identified] : Plan for the patient is to continue with the present level of activity. This was a good home exercise program as well as with outpatient physical therapy. She is also encouraged to continue with ice and elevation as needed along with pain medications. Patient is advised to continue with his conservative management and to return back to the office in another 2 months for reevaluation and management. However she experiences any increase in pain or discomfort to notify the office.

## 2020-03-25 NOTE — HISTORY OF PRESENT ILLNESS
[Improving] : improving [3] : a maximum pain level of 3/10 [Walking] : walking [Standing] : standing [Intermit.] : ~He/She~ states the symptoms seem to be intermittent [de-identified] : Patient is a 69-year-old female who presents today for an evaluation of her left knee. She is status post left total knee replacement on March 9, 2020. She states that she's been doing fairly well without any complaints of pain or discomfort. He states that she has finished physical therapy at home. He may continue with a home exercise program versus outpatient physical therapy due to the current condition. She states that she ambulates with a cane and does take Tylenol and tramadol if needed. But overall she states she feels good improvement.

## 2020-06-09 ENCOUNTER — APPOINTMENT (OUTPATIENT)
Dept: ORTHOPEDIC SURGERY | Facility: CLINIC | Age: 70
End: 2020-06-09
Payer: MEDICARE

## 2020-06-09 VITALS
TEMPERATURE: 98 F | BODY MASS INDEX: 28.73 KG/M2 | HEART RATE: 71 BPM | DIASTOLIC BLOOD PRESSURE: 75 MMHG | SYSTOLIC BLOOD PRESSURE: 112 MMHG | WEIGHT: 194 LBS | HEIGHT: 69 IN

## 2020-06-09 PROCEDURE — 99212 OFFICE O/P EST SF 10 MIN: CPT

## 2020-06-09 PROCEDURE — 73562 X-RAY EXAM OF KNEE 3: CPT | Mod: LT

## 2020-09-07 NOTE — OCCUPATIONAL THERAPY INITIAL EVALUATION ADULT - PATIENT/FAMILY/SIGNIFICANT OTHER GOALS STATEMENT, OT EVAL
to go home

## 2021-05-04 ENCOUNTER — APPOINTMENT (OUTPATIENT)
Dept: ORTHOPEDIC SURGERY | Facility: CLINIC | Age: 71
End: 2021-05-04
Payer: MEDICARE

## 2021-05-04 VITALS
HEART RATE: 85 BPM | SYSTOLIC BLOOD PRESSURE: 100 MMHG | DIASTOLIC BLOOD PRESSURE: 61 MMHG | TEMPERATURE: 97.2 F | WEIGHT: 203.7 LBS | HEIGHT: 69 IN | BODY MASS INDEX: 30.17 KG/M2

## 2021-05-04 PROCEDURE — 73562 X-RAY EXAM OF KNEE 3: CPT | Mod: 50

## 2021-05-04 PROCEDURE — 99213 OFFICE O/P EST LOW 20 MIN: CPT

## 2021-05-24 ENCOUNTER — APPOINTMENT (OUTPATIENT)
Dept: ORTHOPEDIC SURGERY | Facility: CLINIC | Age: 71
End: 2021-05-24
Payer: MEDICARE

## 2021-05-24 VITALS
DIASTOLIC BLOOD PRESSURE: 76 MMHG | SYSTOLIC BLOOD PRESSURE: 111 MMHG | TEMPERATURE: 97.3 F | WEIGHT: 203 LBS | BODY MASS INDEX: 30.07 KG/M2 | HEART RATE: 66 BPM | HEIGHT: 69 IN

## 2021-05-24 PROCEDURE — 20610 DRAIN/INJ JOINT/BURSA W/O US: CPT | Mod: RT

## 2021-05-24 RX ADMIN — Medication 0 MG/3ML: at 00:00

## 2021-05-27 RX ORDER — HYALURONATE SOD, CROSS-LINKED 30 MG/3 ML
30 SYRINGE (ML) INTRAARTICULAR
Refills: 0 | Status: COMPLETED | OUTPATIENT
Start: 2021-05-24

## 2021-07-27 ENCOUNTER — APPOINTMENT (OUTPATIENT)
Dept: ORTHOPEDIC SURGERY | Facility: CLINIC | Age: 71
End: 2021-07-27
Payer: MEDICARE

## 2021-07-27 VITALS
HEART RATE: 66 BPM | DIASTOLIC BLOOD PRESSURE: 73 MMHG | BODY MASS INDEX: 29.33 KG/M2 | SYSTOLIC BLOOD PRESSURE: 117 MMHG | HEIGHT: 69 IN | WEIGHT: 198 LBS

## 2021-07-27 DIAGNOSIS — M17.11 UNILATERAL PRIMARY OSTEOARTHRITIS, RIGHT KNEE: ICD-10-CM

## 2021-07-27 PROCEDURE — 99214 OFFICE O/P EST MOD 30 MIN: CPT

## 2021-07-29 RX ORDER — CIPROFLOXACIN HYDROCHLORIDE 500 MG/1
500 TABLET, FILM COATED ORAL
Qty: 10 | Refills: 0 | Status: ACTIVE | COMMUNITY
Start: 2021-06-01

## 2021-07-29 RX ORDER — ESTRADIOL 10 UG/1
10 TABLET VAGINAL
Qty: 24 | Refills: 0 | Status: ACTIVE | COMMUNITY
Start: 2021-07-06

## 2021-07-29 RX ORDER — BETAMETHASONE DIPROPIONATE 0.5 MG/G
0.05 CREAM, AUGMENTED TOPICAL
Qty: 50 | Refills: 0 | Status: ACTIVE | COMMUNITY
Start: 2021-05-29

## 2021-07-29 RX ORDER — HYDROCORTISONE 25 MG/G
2.5 CREAM TOPICAL
Qty: 28 | Refills: 0 | Status: ACTIVE | COMMUNITY
Start: 2021-05-18

## 2021-09-14 ENCOUNTER — OUTPATIENT (OUTPATIENT)
Dept: OUTPATIENT SERVICES | Facility: HOSPITAL | Age: 71
LOS: 1 days | End: 2021-09-14
Payer: MEDICARE

## 2021-09-14 VITALS
TEMPERATURE: 98 F | RESPIRATION RATE: 16 BRPM | HEART RATE: 71 BPM | HEIGHT: 67.5 IN | SYSTOLIC BLOOD PRESSURE: 105 MMHG | OXYGEN SATURATION: 98 % | WEIGHT: 199.96 LBS | DIASTOLIC BLOOD PRESSURE: 53 MMHG

## 2021-09-14 DIAGNOSIS — Z90.89 ACQUIRED ABSENCE OF OTHER ORGANS: Chronic | ICD-10-CM

## 2021-09-14 DIAGNOSIS — Z98.890 OTHER SPECIFIED POSTPROCEDURAL STATES: Chronic | ICD-10-CM

## 2021-09-14 DIAGNOSIS — M17.11 UNILATERAL PRIMARY OSTEOARTHRITIS, RIGHT KNEE: ICD-10-CM

## 2021-09-14 DIAGNOSIS — Z01.818 ENCOUNTER FOR OTHER PREPROCEDURAL EXAMINATION: ICD-10-CM

## 2021-09-14 LAB
A1C WITH ESTIMATED AVERAGE GLUCOSE RESULT: 5.9 % — HIGH (ref 4–5.6)
ALBUMIN SERPL ELPH-MCNC: 3.6 G/DL — SIGNIFICANT CHANGE UP (ref 3.3–5)
ALP SERPL-CCNC: 77 U/L — SIGNIFICANT CHANGE UP (ref 30–120)
ALT FLD-CCNC: 20 U/L DA — SIGNIFICANT CHANGE UP (ref 10–60)
ANION GAP SERPL CALC-SCNC: 7 MMOL/L — SIGNIFICANT CHANGE UP (ref 5–17)
APTT BLD: 33 SEC — SIGNIFICANT CHANGE UP (ref 27.5–35.5)
AST SERPL-CCNC: 18 U/L — SIGNIFICANT CHANGE UP (ref 10–40)
BILIRUB SERPL-MCNC: 0.4 MG/DL — SIGNIFICANT CHANGE UP (ref 0.2–1.2)
BLD GP AB SCN SERPL QL: SIGNIFICANT CHANGE UP
BUN SERPL-MCNC: 17 MG/DL — SIGNIFICANT CHANGE UP (ref 7–23)
CALCIUM SERPL-MCNC: 8.6 MG/DL — SIGNIFICANT CHANGE UP (ref 8.4–10.5)
CHLORIDE SERPL-SCNC: 106 MMOL/L — SIGNIFICANT CHANGE UP (ref 96–108)
CO2 SERPL-SCNC: 28 MMOL/L — SIGNIFICANT CHANGE UP (ref 22–31)
CREAT SERPL-MCNC: 0.82 MG/DL — SIGNIFICANT CHANGE UP (ref 0.5–1.3)
ESTIMATED AVERAGE GLUCOSE: 123 MG/DL — HIGH (ref 68–114)
GLUCOSE SERPL-MCNC: 94 MG/DL — SIGNIFICANT CHANGE UP (ref 70–99)
HCT VFR BLD CALC: 39.5 % — SIGNIFICANT CHANGE UP (ref 34.5–45)
HGB BLD-MCNC: 12.7 G/DL — SIGNIFICANT CHANGE UP (ref 11.5–15.5)
INR BLD: 0.97 RATIO — SIGNIFICANT CHANGE UP (ref 0.88–1.16)
MCHC RBC-ENTMCNC: 27 PG — SIGNIFICANT CHANGE UP (ref 27–34)
MCHC RBC-ENTMCNC: 32.2 GM/DL — SIGNIFICANT CHANGE UP (ref 32–36)
MCV RBC AUTO: 83.9 FL — SIGNIFICANT CHANGE UP (ref 80–100)
MRSA PCR RESULT.: SIGNIFICANT CHANGE UP
NRBC # BLD: 0 /100 WBCS — SIGNIFICANT CHANGE UP (ref 0–0)
PLATELET # BLD AUTO: 272 K/UL — SIGNIFICANT CHANGE UP (ref 150–400)
POTASSIUM SERPL-MCNC: 4.3 MMOL/L — SIGNIFICANT CHANGE UP (ref 3.5–5.3)
POTASSIUM SERPL-SCNC: 4.3 MMOL/L — SIGNIFICANT CHANGE UP (ref 3.5–5.3)
PROT SERPL-MCNC: 7.2 G/DL — SIGNIFICANT CHANGE UP (ref 6–8.3)
PROTHROM AB SERPL-ACNC: 11.8 SEC — SIGNIFICANT CHANGE UP (ref 10.6–13.6)
RBC # BLD: 4.71 M/UL — SIGNIFICANT CHANGE UP (ref 3.8–5.2)
RBC # FLD: 14.9 % — HIGH (ref 10.3–14.5)
S AUREUS DNA NOSE QL NAA+PROBE: SIGNIFICANT CHANGE UP
SODIUM SERPL-SCNC: 141 MMOL/L — SIGNIFICANT CHANGE UP (ref 135–145)
WBC # BLD: 4.5 K/UL — SIGNIFICANT CHANGE UP (ref 3.8–10.5)
WBC # FLD AUTO: 4.5 K/UL — SIGNIFICANT CHANGE UP (ref 3.8–10.5)

## 2021-09-14 PROCEDURE — 93005 ELECTROCARDIOGRAM TRACING: CPT

## 2021-09-14 PROCEDURE — 86850 RBC ANTIBODY SCREEN: CPT

## 2021-09-14 PROCEDURE — 87641 MR-STAPH DNA AMP PROBE: CPT

## 2021-09-14 PROCEDURE — 85610 PROTHROMBIN TIME: CPT

## 2021-09-14 PROCEDURE — 83036 HEMOGLOBIN GLYCOSYLATED A1C: CPT

## 2021-09-14 PROCEDURE — 86901 BLOOD TYPING SEROLOGIC RH(D): CPT

## 2021-09-14 PROCEDURE — 93010 ELECTROCARDIOGRAM REPORT: CPT

## 2021-09-14 PROCEDURE — G0463: CPT

## 2021-09-14 PROCEDURE — 80053 COMPREHEN METABOLIC PANEL: CPT

## 2021-09-14 PROCEDURE — 85027 COMPLETE CBC AUTOMATED: CPT

## 2021-09-14 PROCEDURE — 85730 THROMBOPLASTIN TIME PARTIAL: CPT

## 2021-09-14 PROCEDURE — 86900 BLOOD TYPING SEROLOGIC ABO: CPT

## 2021-09-14 PROCEDURE — 36415 COLL VENOUS BLD VENIPUNCTURE: CPT

## 2021-09-14 PROCEDURE — 87640 STAPH A DNA AMP PROBE: CPT

## 2021-09-14 RX ORDER — LEVOTHYROXINE SODIUM 125 MCG
1 TABLET ORAL
Qty: 0 | Refills: 0 | DISCHARGE

## 2021-09-14 NOTE — H&P PST ADULT - NSICDXPASTMEDICALHX_GEN_ALL_CORE_FT
PAST MEDICAL HISTORY:  H/O varicose veins right leg, s/p ablation 8/2021    Hyperlipemia     Hypothyroid no med at present    Osteoarthritis right knee    Skin melanoma nose, 20 years ago

## 2021-09-14 NOTE — H&P PST ADULT - HISTORY OF PRESENT ILLNESS
72 y/o female with right knee pain for more than a year. Tried conservative therapy. No pain at tis time as she received in may. Had left knee done couple of months ago. As it si bone on bone was advised replacement. . Fpr PST in NAD 70 y/o female with right knee pain for more than a year. Tried conservative therapy. No pain at tis time as she received in may. Had left knee done couple of months ago. As it is bone on bone was advised replacement. . Fpr PST in NAD

## 2021-09-14 NOTE — H&P PST ADULT - ASSESSMENT
70 y/o female with right knee pain-OA  Planned surgery.- right knee replacement- 10/4/21  Will obtain medical clearance  covid testing 10/1/  Pre op instructions provided  Instructions provided on medications to continue and to take the day morning of surgery   72 y/o female with right knee pain-OA  Planned surgery.- right knee replacement- 10/4/21  Will obtain medical clearance  covid testing 10/1/-rancf vaccinated  Pre op instructions provided  Instructions provided on medications to continue and to take the day morning of surgery

## 2021-10-03 ENCOUNTER — FORM ENCOUNTER (OUTPATIENT)
Age: 71
End: 2021-10-03

## 2021-10-04 ENCOUNTER — OUTPATIENT (OUTPATIENT)
Dept: OUTPATIENT SERVICES | Facility: HOSPITAL | Age: 71
LOS: 1 days | End: 2021-10-04

## 2021-10-04 DIAGNOSIS — Z90.89 ACQUIRED ABSENCE OF OTHER ORGANS: Chronic | ICD-10-CM

## 2021-10-04 DIAGNOSIS — Z98.890 OTHER SPECIFIED POSTPROCEDURAL STATES: Chronic | ICD-10-CM

## 2021-10-04 DIAGNOSIS — Z20.828 CONTACT WITH AND (SUSPECTED) EXPOSURE TO OTHER VIRAL COMMUNICABLE DISEASES: ICD-10-CM

## 2021-10-04 LAB — SARS-COV-2 RNA SPEC QL NAA+PROBE: SIGNIFICANT CHANGE UP

## 2021-10-06 ENCOUNTER — TRANSCRIPTION ENCOUNTER (OUTPATIENT)
Age: 71
End: 2021-10-06

## 2021-10-06 ENCOUNTER — INPATIENT (INPATIENT)
Facility: HOSPITAL | Age: 71
LOS: 0 days | Discharge: HOME | DRG: 470 | End: 2021-10-07
Attending: ORTHOPAEDIC SURGERY | Admitting: ORTHOPAEDIC SURGERY
Payer: MEDICARE

## 2021-10-06 ENCOUNTER — APPOINTMENT (OUTPATIENT)
Dept: ORTHOPEDIC SURGERY | Facility: HOSPITAL | Age: 71
End: 2021-10-06

## 2021-10-06 ENCOUNTER — RESULT REVIEW (OUTPATIENT)
Age: 71
End: 2021-10-06

## 2021-10-06 VITALS
HEIGHT: 68 IN | DIASTOLIC BLOOD PRESSURE: 61 MMHG | WEIGHT: 197.09 LBS | SYSTOLIC BLOOD PRESSURE: 124 MMHG | TEMPERATURE: 98 F | RESPIRATION RATE: 15 BRPM | OXYGEN SATURATION: 100 % | HEART RATE: 74 BPM

## 2021-10-06 DIAGNOSIS — Z98.890 OTHER SPECIFIED POSTPROCEDURAL STATES: Chronic | ICD-10-CM

## 2021-10-06 DIAGNOSIS — M17.11 UNILATERAL PRIMARY OSTEOARTHRITIS, RIGHT KNEE: ICD-10-CM

## 2021-10-06 DIAGNOSIS — Z90.89 ACQUIRED ABSENCE OF OTHER ORGANS: Chronic | ICD-10-CM

## 2021-10-06 PROBLEM — M19.90 UNSPECIFIED OSTEOARTHRITIS, UNSPECIFIED SITE: Chronic | Status: ACTIVE | Noted: 2020-02-19

## 2021-10-06 PROBLEM — E03.9 HYPOTHYROIDISM, UNSPECIFIED: Chronic | Status: ACTIVE | Noted: 2020-02-19

## 2021-10-06 PROBLEM — Z86.79 PERSONAL HISTORY OF OTHER DISEASES OF THE CIRCULATORY SYSTEM: Chronic | Status: ACTIVE | Noted: 2021-09-14

## 2021-10-06 PROBLEM — C43.9 MALIGNANT MELANOMA OF SKIN, UNSPECIFIED: Chronic | Status: ACTIVE | Noted: 2021-09-14

## 2021-10-06 PROCEDURE — 73560 X-RAY EXAM OF KNEE 1 OR 2: CPT | Mod: 26,RT

## 2021-10-06 PROCEDURE — 88305 TISSUE EXAM BY PATHOLOGIST: CPT | Mod: 26

## 2021-10-06 PROCEDURE — 88311 DECALCIFY TISSUE: CPT | Mod: 26

## 2021-10-06 PROCEDURE — 99222 1ST HOSP IP/OBS MODERATE 55: CPT

## 2021-10-06 PROCEDURE — 27447 TOTAL KNEE ARTHROPLASTY: CPT | Mod: RT

## 2021-10-06 RX ORDER — PANTOPRAZOLE SODIUM 20 MG/1
40 TABLET, DELAYED RELEASE ORAL
Refills: 0 | Status: DISCONTINUED | OUTPATIENT
Start: 2021-10-06 | End: 2021-10-07

## 2021-10-06 RX ORDER — ACETAMINOPHEN 500 MG
1000 TABLET ORAL ONCE
Refills: 0 | Status: COMPLETED | OUTPATIENT
Start: 2021-10-06 | End: 2021-10-06

## 2021-10-06 RX ORDER — ONDANSETRON 8 MG/1
4 TABLET, FILM COATED ORAL ONCE
Refills: 0 | Status: DISCONTINUED | OUTPATIENT
Start: 2021-10-06 | End: 2021-10-06

## 2021-10-06 RX ORDER — SODIUM CHLORIDE 9 MG/ML
500 INJECTION INTRAMUSCULAR; INTRAVENOUS; SUBCUTANEOUS ONCE
Refills: 0 | Status: COMPLETED | OUTPATIENT
Start: 2021-10-06 | End: 2021-10-06

## 2021-10-06 RX ORDER — OMEPRAZOLE 10 MG/1
1 CAPSULE, DELAYED RELEASE ORAL
Qty: 30 | Refills: 1
Start: 2021-10-06 | End: 2021-12-04

## 2021-10-06 RX ORDER — ROSUVASTATIN CALCIUM 5 MG/1
1 TABLET ORAL
Qty: 0 | Refills: 0 | DISCHARGE

## 2021-10-06 RX ORDER — OXYCODONE HYDROCHLORIDE 5 MG/1
5 TABLET ORAL
Refills: 0 | Status: DISCONTINUED | OUTPATIENT
Start: 2021-10-06 | End: 2021-10-06

## 2021-10-06 RX ORDER — TRAMADOL HYDROCHLORIDE 50 MG/1
100 TABLET ORAL EVERY 6 HOURS
Refills: 0 | Status: DISCONTINUED | OUTPATIENT
Start: 2021-10-06 | End: 2021-10-07

## 2021-10-06 RX ORDER — APREPITANT 80 MG/1
40 CAPSULE ORAL ONCE
Refills: 0 | Status: COMPLETED | OUTPATIENT
Start: 2021-10-06 | End: 2021-10-06

## 2021-10-06 RX ORDER — ACETAMINOPHEN 500 MG
2 TABLET ORAL
Qty: 0 | Refills: 0 | DISCHARGE
Start: 2021-10-06 | End: 2021-10-20

## 2021-10-06 RX ORDER — INFLUENZA VIRUS VACCINE 15; 15; 15; 15 UG/.5ML; UG/.5ML; UG/.5ML; UG/.5ML
0.5 SUSPENSION INTRAMUSCULAR ONCE
Refills: 0 | Status: COMPLETED | OUTPATIENT
Start: 2021-10-06 | End: 2021-10-06

## 2021-10-06 RX ORDER — APIXABAN 2.5 MG/1
2.5 TABLET, FILM COATED ORAL EVERY 12 HOURS
Refills: 0 | Status: DISCONTINUED | OUTPATIENT
Start: 2021-10-07 | End: 2021-10-07

## 2021-10-06 RX ORDER — MAGNESIUM HYDROXIDE 400 MG/1
30 TABLET, CHEWABLE ORAL DAILY
Refills: 0 | Status: DISCONTINUED | OUTPATIENT
Start: 2021-10-06 | End: 2021-10-07

## 2021-10-06 RX ORDER — ACETAMINOPHEN 500 MG
1000 TABLET ORAL ONCE
Refills: 0 | Status: COMPLETED | OUTPATIENT
Start: 2021-10-07 | End: 2021-10-07

## 2021-10-06 RX ORDER — HYDROMORPHONE HYDROCHLORIDE 2 MG/ML
0.5 INJECTION INTRAMUSCULAR; INTRAVENOUS; SUBCUTANEOUS
Refills: 0 | Status: DISCONTINUED | OUTPATIENT
Start: 2021-10-06 | End: 2021-10-06

## 2021-10-06 RX ORDER — SENNA PLUS 8.6 MG/1
2 TABLET ORAL AT BEDTIME
Refills: 0 | Status: DISCONTINUED | OUTPATIENT
Start: 2021-10-06 | End: 2021-10-07

## 2021-10-06 RX ORDER — CELECOXIB 200 MG/1
200 CAPSULE ORAL EVERY 12 HOURS
Refills: 0 | Status: DISCONTINUED | OUTPATIENT
Start: 2021-10-07 | End: 2021-10-07

## 2021-10-06 RX ORDER — POLYETHYLENE GLYCOL 3350 17 G/17G
17 POWDER, FOR SOLUTION ORAL
Qty: 0 | Refills: 0 | DISCHARGE
Start: 2021-10-06

## 2021-10-06 RX ORDER — OXYCODONE HYDROCHLORIDE 5 MG/1
10 TABLET ORAL
Refills: 0 | Status: DISCONTINUED | OUTPATIENT
Start: 2021-10-06 | End: 2021-10-06

## 2021-10-06 RX ORDER — TRANEXAMIC ACID 100 MG/ML
1000 INJECTION, SOLUTION INTRAVENOUS ONCE
Refills: 0 | Status: COMPLETED | OUTPATIENT
Start: 2021-10-06 | End: 2021-10-06

## 2021-10-06 RX ORDER — SODIUM CHLORIDE 9 MG/ML
1000 INJECTION, SOLUTION INTRAVENOUS
Refills: 0 | Status: DISCONTINUED | OUTPATIENT
Start: 2021-10-06 | End: 2021-10-06

## 2021-10-06 RX ORDER — SODIUM CHLORIDE 9 MG/ML
1000 INJECTION, SOLUTION INTRAVENOUS
Refills: 0 | Status: DISCONTINUED | OUTPATIENT
Start: 2021-10-06 | End: 2021-10-07

## 2021-10-06 RX ORDER — CHLORHEXIDINE GLUCONATE 213 G/1000ML
1 SOLUTION TOPICAL ONCE
Refills: 0 | Status: COMPLETED | OUTPATIENT
Start: 2021-10-06 | End: 2021-10-06

## 2021-10-06 RX ORDER — ATORVASTATIN CALCIUM 80 MG/1
20 TABLET, FILM COATED ORAL AT BEDTIME
Refills: 0 | Status: DISCONTINUED | OUTPATIENT
Start: 2021-10-06 | End: 2021-10-07

## 2021-10-06 RX ORDER — TRAMADOL HYDROCHLORIDE 50 MG/1
50 TABLET ORAL EVERY 8 HOURS
Refills: 0 | Status: DISCONTINUED | OUTPATIENT
Start: 2021-10-06 | End: 2021-10-06

## 2021-10-06 RX ORDER — TRAMADOL HYDROCHLORIDE 50 MG/1
50 TABLET ORAL EVERY 4 HOURS
Refills: 0 | Status: DISCONTINUED | OUTPATIENT
Start: 2021-10-06 | End: 2021-10-07

## 2021-10-06 RX ORDER — CELECOXIB 200 MG/1
1 CAPSULE ORAL
Qty: 60 | Refills: 0
Start: 2021-10-06 | End: 2021-11-04

## 2021-10-06 RX ORDER — SENNA PLUS 8.6 MG/1
2 TABLET ORAL
Qty: 0 | Refills: 0 | DISCHARGE
Start: 2021-10-06

## 2021-10-06 RX ORDER — HYDROMORPHONE HYDROCHLORIDE 2 MG/ML
0.5 INJECTION INTRAMUSCULAR; INTRAVENOUS; SUBCUTANEOUS
Refills: 0 | Status: DISCONTINUED | OUTPATIENT
Start: 2021-10-06 | End: 2021-10-07

## 2021-10-06 RX ORDER — POLYETHYLENE GLYCOL 3350 17 G/17G
17 POWDER, FOR SOLUTION ORAL AT BEDTIME
Refills: 0 | Status: DISCONTINUED | OUTPATIENT
Start: 2021-10-06 | End: 2021-10-07

## 2021-10-06 RX ORDER — CEFAZOLIN SODIUM 1 G
2000 VIAL (EA) INJECTION ONCE
Refills: 0 | Status: COMPLETED | OUTPATIENT
Start: 2021-10-06 | End: 2021-10-06

## 2021-10-06 RX ORDER — CEFAZOLIN SODIUM 1 G
2000 VIAL (EA) INJECTION EVERY 8 HOURS
Refills: 0 | Status: COMPLETED | OUTPATIENT
Start: 2021-10-06 | End: 2021-10-07

## 2021-10-06 RX ORDER — APIXABAN 2.5 MG/1
1 TABLET, FILM COATED ORAL
Qty: 23 | Refills: 0
Start: 2021-10-06 | End: 2021-10-17

## 2021-10-06 RX ORDER — ACETAMINOPHEN 500 MG
1000 TABLET ORAL EVERY 8 HOURS
Refills: 0 | Status: DISCONTINUED | OUTPATIENT
Start: 2021-10-07 | End: 2021-10-07

## 2021-10-06 RX ORDER — ASPIRIN/CALCIUM CARB/MAGNESIUM 324 MG
1 TABLET ORAL
Qty: 56 | Refills: 0
Start: 2021-10-06 | End: 2021-11-02

## 2021-10-06 RX ADMIN — APREPITANT 40 MILLIGRAM(S): 80 CAPSULE ORAL at 06:38

## 2021-10-06 RX ADMIN — OXYCODONE HYDROCHLORIDE 5 MILLIGRAM(S): 5 TABLET ORAL at 16:45

## 2021-10-06 RX ADMIN — OXYCODONE HYDROCHLORIDE 5 MILLIGRAM(S): 5 TABLET ORAL at 15:47

## 2021-10-06 RX ADMIN — SODIUM CHLORIDE 500 MILLILITER(S): 9 INJECTION INTRAMUSCULAR; INTRAVENOUS; SUBCUTANEOUS at 18:09

## 2021-10-06 RX ADMIN — TRAMADOL HYDROCHLORIDE 50 MILLIGRAM(S): 50 TABLET ORAL at 21:20

## 2021-10-06 RX ADMIN — ATORVASTATIN CALCIUM 20 MILLIGRAM(S): 80 TABLET, FILM COATED ORAL at 21:15

## 2021-10-06 RX ADMIN — Medication 100 MILLIGRAM(S): at 17:22

## 2021-10-06 RX ADMIN — Medication 400 MILLIGRAM(S): at 15:16

## 2021-10-06 RX ADMIN — TRAMADOL HYDROCHLORIDE 50 MILLIGRAM(S): 50 TABLET ORAL at 00:21

## 2021-10-06 RX ADMIN — SODIUM CHLORIDE 100 MILLILITER(S): 9 INJECTION, SOLUTION INTRAVENOUS at 15:15

## 2021-10-06 RX ADMIN — Medication 1000 MILLIGRAM(S): at 21:16

## 2021-10-06 RX ADMIN — SODIUM CHLORIDE 500 MILLILITER(S): 9 INJECTION INTRAMUSCULAR; INTRAVENOUS; SUBCUTANEOUS at 11:00

## 2021-10-06 RX ADMIN — Medication 1000 MILLIGRAM(S): at 16:00

## 2021-10-06 RX ADMIN — SODIUM CHLORIDE 75 MILLILITER(S): 9 INJECTION, SOLUTION INTRAVENOUS at 11:24

## 2021-10-06 RX ADMIN — Medication 400 MILLIGRAM(S): at 21:15

## 2021-10-06 RX ADMIN — CHLORHEXIDINE GLUCONATE 1 APPLICATION(S): 213 SOLUTION TOPICAL at 06:39

## 2021-10-06 NOTE — DISCHARGE NOTE PROVIDER - PROVIDER TOKENS
PROVIDER:[TOKEN:[[id number]],SCHEDULEDAPPT:[10/25/2021],SCHEDULEDAPPTTIME:[01:20 PM],ESTABLISHEDPATIENT:[T]]

## 2021-10-06 NOTE — CONSULT NOTE ADULT - ASSESSMENT
71F HLD, Hypothyroidism, and OA admitted for aftercare following Right TKR     S/P Right TKR 10/6/21  - Continue Bowel and pain control regimen;  Incentive Spirometer for lung expansion; Monitor Hgb and follow up electrolytes.      HLD - Statin    Hypothyroidism - Not on any medications     Diet - Regular    DVT Prophylaxis - Eliquis    Disposition - Discharge planning pending hospital course

## 2021-10-06 NOTE — DISCHARGE NOTE NURSING/CASE MANAGEMENT/SOCIAL WORK - PATIENT PORTAL LINK FT
You can access the FollowMyHealth Patient Portal offered by [facility] by registering at the following website: [email or web address]. By joining [employer]’s FollowMyHealth portal, you will also be able to view your health information using other applications (apps) compatible with our system.

## 2021-10-06 NOTE — BRIEF OPERATIVE NOTE - NSICDXBRIEFPOSTOP_GEN_ALL_CORE_FT
POST-OP DIAGNOSIS:  Primary localized osteoarthritis of right knee 06-Oct-2021 07:27:04  Sean Hooks

## 2021-10-06 NOTE — PHYSICAL THERAPY INITIAL EVALUATION ADULT - ACTIVE RANGE OF MOTION EXAMINATION, REHAB EVAL
Right LE WNL at hip and foot. Right knee 60 degrees./ayaka. upper extremity Active ROM was WNL (within normal limits)/LLE Active ROM was WNL (within normal limits)/deficits as listed below

## 2021-10-06 NOTE — CONSULT NOTE ADULT - SUBJECTIVE AND OBJECTIVE BOX
HPI: 71F HLD, Hypothyroidism, and OA has been combatting pain in right knee for several years which has progressively worsened.  Patient has tried multiple options for pain relief including OTC medication and as well as PT with minimal relief and has undergone elective replacement of right knee successfully.  Patient is currently resting in bed comfortable with good pain control.     REVIEW OF SYSTEMS:  CONSTITUTIONAL: No fever, weight loss, or fatigue  EYES: No eye pain, visual disturbances, or discharge  ENMT:  No difficulty hearing, tinnitus, vertigo; No sinus or throat pain  NECK: No pain or stiffness  RESPIRATORY: No cough, wheezing, chills or hemoptysis; No shortness of breath  CARDIOVASCULAR: No chest pain, palpitations, dizziness, or leg swelling  GASTROINTESTINAL: No abdominal or epigastric pain. No nausea, vomiting, or hematemesis; No diarrhea or constipation. No melena or hematochezia.  GENITOURINARY: No dysuria, frequency, hematuria, or incontinence  NEUROLOGICAL: No headaches, memory loss, loss of strength, numbness, or tremors  MUSCULOSKELETAL: No muscle or back pain      PAST MEDICAL & SURGICAL HISTORY:  Hypothyroid  no med at present    Hyperlipemia    Osteoarthritis  right knee    Skin melanoma  nose, 20 years ago    H/O varicose veins  right leg, s/p ablation 2021    S/P knee surgery  meniscus 1 0n right, 2 on left    S/P tonsillectomy  child    S/P ear surgery  left side age 23    H/O melanoma excision  nose age 48        SOCIAL HISTORY:  Negative for Tobacco, EtOH and Illicit Drugs    Allergies    No Known Allergies    Intolerances    codeine (Nausea)      MEDICATIONS  (STANDING):  acetaminophen  IVPB .. 1000 milliGRAM(s) IV Intermittent once  atorvastatin 20 milliGRAM(s) Oral at bedtime  ceFAZolin   IVPB 2000 milliGRAM(s) IV Intermittent every 8 hours  HYDROmorphone  Injectable 0.5 milliGRAM(s) IV Push every 3 hours  lactated ringers. 1000 milliLiter(s) (100 mL/Hr) IV Continuous <Continuous>  pantoprazole    Tablet 40 milliGRAM(s) Oral before breakfast  polyethylene glycol 3350 17 Gram(s) Oral at bedtime  senna 2 Tablet(s) Oral at bedtime    MEDICATIONS  (PRN):  magnesium hydroxide Suspension 30 milliLiter(s) Oral daily PRN Constipation  oxyCODONE    IR 5 milliGRAM(s) Oral every 3 hours PRN Moderate Pain (4 - 6)  oxyCODONE    IR 10 milliGRAM(s) Oral every 3 hours PRN Severe Pain (7 - 10)      FAMILY HISTORY:  FH: colon cancer  father     FH: heart failure  mother     FH: brain aneurysm  brother         Vital Signs Last 24 Hrs  T(C): 36.7 (06 Oct 2021 15:10), Max: 36.9 (06 Oct 2021 06:10)  T(F): 98 (06 Oct 2021 15:10), Max: 98.4 (06 Oct 2021 06:10)  HR: 75 (06 Oct 2021 15:10) (63 - 80)  BP: 100/58 (06 Oct 2021 15:10) (95/43 - 130/50)  BP(mean): --  RR: 16 (06 Oct 2021 15:10) (10 - 16)  SpO2: 96% (06 Oct 2021 15:10) (94% - 100%)    PHYSICAL EXAM:    GENERAL: NAD, well-developed  HEAD:  Atraumatic, Normocephalic  EYES: EOMI, PERRLA, conjunctiva and sclera clear  ENMT: No tonsillar erythema, exudates, or enlargement; Moist mucous membranes, Good dentition, No lesions  NECK: Supple, No JVD, Normal thyroid  NERVOUS SYSTEM:  Alert & Oriented X3, Good concentration;  CHEST/LUNG: Clear to auscultation bilaterally; No rales, rhonchi, wheezing, or rubs  HEART: Regular rate and rhythm; No murmurs, rubs, or gallops  ABDOMEN: Soft, Nontender, Nondistended; Bowel sounds present  EXTREMITIES:  2+ Peripheral Pulses, No clubbing, cyanosis, or edema      LABS:              CAPILLARY BLOOD GLUCOSE          RADIOLOGY & ADDITIONAL STUDIES:    EKG:   Personally Reviewed:  [ ] YES     Imaging:   Personally Reviewed:  [ ] YES     Consultant(s) Notes Reviewed:      Care Discussed with Consultants/Other Providers:

## 2021-10-06 NOTE — DISCHARGE NOTE PROVIDER - NSDCMRMEDTOKEN_GEN_ALL_CORE_FT
Crestor 5 mg oral tablet: 1 tab(s) orally once a day   acetaminophen 500 mg oral tablet: 2 tab(s) orally every 8 hours  Aspirin Enteric Coated 81 mg oral delayed release tablet: Start 1 tab orally every 12 hours for 28 days, once Eliquis is completed.Take 2 hours before Celebrex.  celecoxib 200 mg oral capsule: 1 cap orally every 12 hours.  Crestor 5 mg oral tablet: 1 tab(s) orally once a day  Eliquis 2.5 mg oral tablet: 1 tab orally every 12 hours. Change to Aspirin 81mg every 12 hours for 28 days, once Eliquis is completed.   omeprazole 20 mg oral delayed release capsule: 1 cap orally once a day   polyethylene glycol 3350 oral powder for reconstitution: 17 gram(s) orally once a day (at bedtime)  senna oral tablet: 2 tab(s) orally once a day (at bedtime)   acetaminophen 500 mg oral tablet: 2 tab(s) orally every 8 hours  Aspirin Enteric Coated 81 mg oral delayed release tablet: Start 1 tab orally every 12 hours for 28 days, once Eliquis is completed.Take 2 hours before Celebrex.  celecoxib 200 mg oral capsule: 1 cap orally every 12 hours.  Crestor 5 mg oral tablet: 1 tab(s) orally once a day  Eliquis 2.5 mg oral tablet: 1 tab orally every 12 hours. Change to Aspirin 81mg every 12 hours for 28 days, once Eliquis is completed.   omeprazole 20 mg oral delayed release capsule: 1 cap orally once a day   polyethylene glycol 3350 oral powder for reconstitution: 17 gram(s) orally once a day (at bedtime)  senna oral tablet: 2 tab(s) orally once a day (at bedtime)  traMADol 50 mg oral tablet: 1-2  tab(s) orally every 4-6  hours   [facility] ref# [id number]  MDD:6

## 2021-10-06 NOTE — DISCHARGE NOTE PROVIDER - HOSPITAL COURSE
This patient was admitted to [facility] with a history of severe degenerative joint disease of the right knee.  Patient underwent Pre-Surgical Testing and was medically cleared to undergo elective procedure. Patient underwent Right TKR by Dr. Alexei Fuentes on 10/6/21. Procedure was well tolerated.  No operative or concepcion-operative complications arose during patient's hospital course.  Patient received antibiotic according to SCIP guidelines for infection prevention.  Eliquis 2.5mg q 12h was given for DVT prophylaxis, in addition to the use of SCDs.  Anesthesia, Medical Hospitalist, Physical Therapy and Occupational Therapy were consulted. Patient is stable for discharge with a good prognosis.  Appropriate discharge instructions and medications are provided in this document.

## 2021-10-06 NOTE — DISCHARGE NOTE PROVIDER - NSDCCPTREATMENT_GEN_ALL_CORE_FT
PRINCIPAL PROCEDURE  Procedure: Right total knee arthroplasty  Findings and Treatment: Severe DJD right knee.

## 2021-10-06 NOTE — DISCHARGE NOTE PROVIDER - NSDCCPCAREPLAN_GEN_ALL_CORE_FT
PRINCIPAL DISCHARGE DIAGNOSIS  Diagnosis: Primary localized osteoarthritis of right knee  Assessment and Plan of Treatment: For your total knee replacement:  Physical Therapy/Occupational Therapy for: ambulation, transfers, stairs, ADL's (activities of daily living), range of motion exercises, and isometrics  -Activity  • Weight Bearing as tolerated with rolling walker.  • Ice 20 minutes several times daily with at least a 20 minute break in between icing sessions  • Take short, frequent walks increasing the distance that you walk each day as tolerated.  • Change your position every hour to decrease pain and stiffness.  • Continue the exercises taught to you by your physical therapist.  • No driving until cleared by the doctor.  • No tub baths, hot tubs, or swimming pools until instructed by your doctor.  • Do not squat down on the floor.  • Do not kneel or twist your knee.  • Range of Motion Goals: Flexion= 120 degrees, Extension = 0 degrees  Daily dressing changes if incision is not completely dry.  If inicision is dry, it may be left open to air.  Keep incision clean. DO NOT APPLY ANYTHING to incision site (salves/ointments/creams).  May shower post-op if no drainage from incision.  Do not scrub incision site. Pat dry after shower.  Prineo removal 2 weeks after surgery at Surgeon's office.

## 2021-10-06 NOTE — OCCUPATIONAL THERAPY INITIAL EVALUATION ADULT - HEALTH SCREEN CRITERIA
HPI:    14y G0 LMP early november presents with 1 week history of periurethral cyst. Patient reports she had a cyst in the same spot in June. She was given PO antibiotics and it drained spontaneously, and was put on PO antibiotics afterwards for infection. Patient reports that the cyst reappeared 1 week ago and has gotten progressively bigger and more painful. She is able to void spontaneously without problems. Denies fevers, chills, N/V, vaginal discharge.       OBHx: G0  GynHx: irregular menses, not sexually active. Denies history of ovarian cysts  PMH: asthma  PSH:denies  All: augmentin  Meds: q-pat      Vital Signs Last 24 Hrs  T(C): 36.9 (18 Nov 2017 15:27), Max: 36.9 (18 Nov 2017 15:27)  T(F): 98.4 (18 Nov 2017 15:27), Max: 98.4 (18 Nov 2017 15:27)  HR: 82 (18 Nov 2017 15:27) (82 - 82)  BP: 128/69 (18 Nov 2017 15:27) (128/69 - 128/69)  BP(mean): --  RR: 18 (18 Nov 2017 15:27) (18 - 18)  SpO2: 100% (18 Nov 2017 15:27) (100% - 100%)    PE:  Gen: Comfortable, NAD  perineum: 1.5cm periurethral cyst, area of white fluctuance at the superior aspect of the cyst immediately inferior to the urethra. no drainage. no other vulvar lesions yes

## 2021-10-06 NOTE — PHYSICAL THERAPY INITIAL EVALUATION ADULT - ADDITIONAL COMMENTS
Pt lives in house with 3  steps to enter with 2 close handrails., 0 steps inside. Ranch style house. Owns DME:-[employer] cane . Has stall shower. Pt's spouse will assist at home upon d/c.

## 2021-10-06 NOTE — PROGRESS NOTE ADULT - SUBJECTIVE AND OBJECTIVE BOX
Orthopaedic Post Op Note    Procedure: Right TKR  Surgeon: Alexei Fuentes    71y Female comfortable, without complaints. Resting comfortably in bed. Ambulated with PT. Tolerating diet. Voided. Reported pain score = 0  Denies N/V, CP, SOB, numbness/tingling of extremities.    PE:  Vital Signs Last 24 Hrs  T(C): 36.7 (06 Oct 2021 15:10), Max: 36.9 (06 Oct 2021 06:10)  T(F): 98 (06 Oct 2021 15:10), Max: 98.4 (06 Oct 2021 06:10)  HR: 75 (06 Oct 2021 15:10) (63 - 80)  BP: 100/58 (06 Oct 2021 15:10) (95/43 - 130/50)  RR: 16 (06 Oct 2021 15:10) (10 - 16)  SpO2: 96% (06 Oct 2021 15:10) (94% - 100%)  General: Pt alert and oriented   Lungs: + BS CTA bilaterally  Heart: +S1 & S2 heard, RRR  Abd: + BS heard, soft, NT, ND  Right Knee Dressing: C/D/I   Bilateral LEs:  Motor:   5/5 dorsiflexion, plantarflexion, EHL  Sensation intact to LT  2+ DP Pulses  SCDs in place      A/P: 71y Female POD#0 s/p Right TKR  - Stable  - Acetaminophen, Celebrex, Oxycodone, Dilaudid for Pain Control   - DVT ppx: Eliquis 2.5mg q 12h  - Radha op IV abx: Ancef  - PT, OT per protocol  - F/U AM Labs  DCP = home tomorrow pending PT, OT, medical clearance         Orthopaedic Post Op Note    Procedure: Right TKR  Surgeon: Alexei Fuentes    71y Female comfortable, without complaints. Resting comfortably in bed. Ambulated with PT. Tolerating diet. Voided. Reported pain score = 0  Denies N/V, CP, SOB, numbness/tingling of extremities.    PE:  Vital Signs Last 24 Hrs  T(C): 36.7 (06 Oct 2021 15:10), Max: 36.9 (06 Oct 2021 06:10)  T(F): 98 (06 Oct 2021 15:10), Max: 98.4 (06 Oct 2021 06:10)  HR: 75 (06 Oct 2021 15:10) (63 - 80)  BP: 100/58 (06 Oct 2021 15:10) (95/43 - 130/50)  RR: 16 (06 Oct 2021 15:10) (10 - 16)  SpO2: 96% (06 Oct 2021 15:10) (94% - 100%)  General: Pt alert and oriented   Lungs: + BS CTA bilaterally  Heart: +S1 & S2 heard, RRR  Abd: + BS heard, soft, NT, ND  Right Knee Dressing: C/D/I   Bilateral LEs:  Motor:   5/5 dorsiflexion, plantarflexion, EHL  Sensation intact to LT  2+ DP Pulses  SCDs in place      A/P: 71y Female POD#0 s/p Right TKR  - Stable  - Acetaminophen, Celebrex, Tramadol, Dilaudid for Pain Control   - DVT ppx: Eliquis 2.5mg q 12h  - Radha op IV abx: Ancef  - PT, OT per protocol  - F/U AM Labs  DCP = home tomorrow pending PT, OT, medical clearance

## 2021-10-06 NOTE — DISCHARGE NOTE PROVIDER - CARE PROVIDER_API CALL
Alexei Fuentes)  Orthopedic Surgery  [address], [address]  [place] [address]  Phone: [phone]  Fax: [phone]  Established Patient  Scheduled Appointment: 10/25/2021 01:20 PM

## 2021-10-06 NOTE — DISCHARGE NOTE NURSING/CASE MANAGEMENT/SOCIAL WORK - NSSCNAMETXT_GEN_ALL_CORE
[facility] - [phone] or [phone]  PT to visit the day after hospital discharge; RN to follow if there is a need. Please contact the home care agency if you have not heard from them by 12 noon on the day after your hospital discharge.

## 2021-10-06 NOTE — RESEARCH COMMUNICATION NOTE - NS AS RESEARCH COMMUNICATION NOTE FT
Patient seen in the preoperative unit.  Patient agrees to continue participation in the Bone Wax Clinical Trial.  Re-consent obtained by the surgeon as the previous consent was >28 days from the date of surgery.  A copy of the consent was provided to the patient and a copy was placed in the patient chart.   Inclusion /Exclusion Criteria reviewed with the surgeon.  The patient meets the study criteria for enrollment.  Randomization completed via the [employer] system.  The surgeon and operating room notified of the treatment arm. Zaynab Sheth, Research -[phone]

## 2021-10-06 NOTE — DISCHARGE NOTE NURSING/CASE MANAGEMENT/SOCIAL WORK - NSDCDMENAME_GEN_ALL_CORE_FT
[facility]  Prior to this admission you had a rolling walker and a raised toilet seat in the home.

## 2021-10-06 NOTE — BRIEF OPERATIVE NOTE - NSICDXBRIEFPREOP_GEN_ALL_CORE_FT
PRE-OP DIAGNOSIS:  Primary localized osteoarthritis of right knee 06-Oct-2021 07:27:06  Sean Hooks

## 2021-10-07 VITALS
OXYGEN SATURATION: 98 % | DIASTOLIC BLOOD PRESSURE: 76 MMHG | RESPIRATION RATE: 18 BRPM | SYSTOLIC BLOOD PRESSURE: 126 MMHG | TEMPERATURE: 98 F | HEART RATE: 75 BPM

## 2021-10-07 LAB
ANION GAP SERPL CALC-SCNC: 7 MMOL/L — SIGNIFICANT CHANGE UP (ref 5–17)
BUN SERPL-MCNC: 12 MG/DL — SIGNIFICANT CHANGE UP (ref 7–23)
CALCIUM SERPL-MCNC: 9.2 MG/DL — SIGNIFICANT CHANGE UP (ref 8.4–10.5)
CHLORIDE SERPL-SCNC: 104 MMOL/L — SIGNIFICANT CHANGE UP (ref 96–108)
CO2 SERPL-SCNC: 27 MMOL/L — SIGNIFICANT CHANGE UP (ref 22–31)
COVID-19 SPIKE DOMAIN AB INTERP: POSITIVE
COVID-19 SPIKE DOMAIN ANTIBODY RESULT: >250 U/ML — HIGH
CREAT SERPL-MCNC: 0.81 MG/DL — SIGNIFICANT CHANGE UP (ref 0.5–1.3)
GLUCOSE SERPL-MCNC: 103 MG/DL — HIGH (ref 70–99)
HCT VFR BLD CALC: 36.7 % — SIGNIFICANT CHANGE UP (ref 34.5–45)
HGB BLD-MCNC: 11.5 G/DL — SIGNIFICANT CHANGE UP (ref 11.5–15.5)
MCHC RBC-ENTMCNC: 26.5 PG — LOW (ref 27–34)
MCHC RBC-ENTMCNC: 31.3 GM/DL — LOW (ref 32–36)
MCV RBC AUTO: 84.6 FL — SIGNIFICANT CHANGE UP (ref 80–100)
NRBC # BLD: 0 /100 WBCS — SIGNIFICANT CHANGE UP (ref 0–0)
PLATELET # BLD AUTO: 256 K/UL — SIGNIFICANT CHANGE UP (ref 150–400)
POTASSIUM SERPL-MCNC: 4 MMOL/L — SIGNIFICANT CHANGE UP (ref 3.5–5.3)
POTASSIUM SERPL-SCNC: 4 MMOL/L — SIGNIFICANT CHANGE UP (ref 3.5–5.3)
RBC # BLD: 4.34 M/UL — SIGNIFICANT CHANGE UP (ref 3.8–5.2)
RBC # FLD: 14.8 % — HIGH (ref 10.3–14.5)
SARS-COV-2 IGG+IGM SERPL QL IA: >250 U/ML — HIGH
SARS-COV-2 IGG+IGM SERPL QL IA: POSITIVE
SODIUM SERPL-SCNC: 138 MMOL/L — SIGNIFICANT CHANGE UP (ref 135–145)
WBC # BLD: 12.39 K/UL — HIGH (ref 3.8–10.5)
WBC # FLD AUTO: 12.39 K/UL — HIGH (ref 3.8–10.5)

## 2021-10-07 PROCEDURE — 99232 SBSQ HOSP IP/OBS MODERATE 35: CPT

## 2021-10-07 RX ORDER — CALCIUM CARBONATE 500(1250)
2 TABLET ORAL ONCE
Refills: 0 | Status: COMPLETED | OUTPATIENT
Start: 2021-10-07 | End: 2021-10-07

## 2021-10-07 RX ORDER — TRAMADOL HYDROCHLORIDE 50 MG/1
1 TABLET ORAL
Qty: 42 | Refills: 0
Start: 2021-10-07 | End: 2021-10-13

## 2021-10-07 RX ADMIN — Medication 100 MILLIGRAM(S): at 00:43

## 2021-10-07 RX ADMIN — Medication 1000 MILLIGRAM(S): at 11:40

## 2021-10-07 RX ADMIN — Medication 1000 MILLIGRAM(S): at 11:41

## 2021-10-07 RX ADMIN — CELECOXIB 200 MILLIGRAM(S): 200 CAPSULE ORAL at 09:35

## 2021-10-07 RX ADMIN — TRAMADOL HYDROCHLORIDE 50 MILLIGRAM(S): 50 TABLET ORAL at 04:00

## 2021-10-07 RX ADMIN — PANTOPRAZOLE SODIUM 40 MILLIGRAM(S): 20 TABLET, DELAYED RELEASE ORAL at 06:01

## 2021-10-07 RX ADMIN — Medication 2 TABLET(S): at 04:04

## 2021-10-07 RX ADMIN — APIXABAN 2.5 MILLIGRAM(S): 2.5 TABLET, FILM COATED ORAL at 09:35

## 2021-10-07 RX ADMIN — TRAMADOL HYDROCHLORIDE 50 MILLIGRAM(S): 50 TABLET ORAL at 03:30

## 2021-10-07 NOTE — PROGRESS NOTE ADULT - ASSESSMENT
71F HLD, Hypothyroidism, and OA admitted for aftercare following Right TKR     S/P Right TKR 10/6/21  - Continue Bowel and pain control regimen;  Incentive Spirometer for lung expansion; Monitor Hgb and follow up electrolytes.      HLD - Statin    Hypothyroidism - Not on any medications     Diet - Regular    DVT Prophylaxis - Eliquis    Disposition - Patient medically optimized for discharge home if cleared by PT and Ortho.

## 2021-10-07 NOTE — SBIRT NOTE ADULT - NSSBIRTALCPOSREINDET_GEN_A_CORE
patient does not feel her alcohol consumption is problematic at this time, she has glass of wine at night before dinner

## 2021-10-07 NOTE — PROGRESS NOTE ADULT - SUBJECTIVE AND OBJECTIVE BOX
Subjective: Patient seen and examined. No overnight events. Getting ready to work with PT    MEDICATIONS  (STANDING):  acetaminophen   Tablet .. 1000 milliGRAM(s) Oral every 8 hours  apixaban 2.5 milliGRAM(s) Oral every 12 hours  atorvastatin 20 milliGRAM(s) Oral at bedtime  celecoxib 200 milliGRAM(s) Oral every 12 hours  HYDROmorphone  Injectable 0.5 milliGRAM(s) IV Push every 3 hours  lactated ringers. 1000 milliLiter(s) (100 mL/Hr) IV Continuous <Continuous>  pantoprazole    Tablet 40 milliGRAM(s) Oral before breakfast  polyethylene glycol 3350 17 Gram(s) Oral at bedtime  senna 2 Tablet(s) Oral at bedtime    MEDICATIONS  (PRN):  aluminum hydroxide/magnesium hydroxide/simethicone Suspension 30 milliLiter(s) Oral every 6 hours PRN Dyspepsia  magnesium hydroxide Suspension 30 milliLiter(s) Oral daily PRN Constipation  traMADol 50 milliGRAM(s) Oral every 4 hours PRN Moderate Pain (4 - 6)  traMADol 100 milliGRAM(s) Oral every 6 hours PRN Severe Pain (7 - 10)      Allergies    No Known Allergies    Intolerances    codeine (Nausea)      Vital Signs Last 24 Hrs  T(C): 36.6 (07 Oct 2021 08:31), Max: 36.7 (06 Oct 2021 15:10)  T(F): 97.8 (07 Oct 2021 08:31), Max: 98 (06 Oct 2021 15:10)  HR: 75 (07 Oct 2021 08:31) (64 - 80)  BP: 126/76 (07 Oct 2021 08:31) (100/58 - 135/84)  BP(mean): --  RR: 18 (07 Oct 2021 08:31) (12 - 18)  SpO2: 98% (07 Oct 2021 08:31) (94% - 100%)    PHYSICAL EXAM:  GENERAL: NAD, well-groomed, well-developed  HEAD:  Atraumatic, Normocephalic  ENMT: Moist mucous membranes,   NECK: Supple, No JVD, Normal thyroid  NERVOUS SYSTEM:  All 4 extremities mobile, no gross sensory deficits.   CHEST/LUNG: Clear to auscultation bilaterally; No rales, rhonchi, wheezing, or rubs  HEART: Regular rate and rhythm; No murmurs, rubs, or gallops  ABDOMEN: Soft, Nontender, Nondistended; Bowel sounds present  EXTREMITIES:  2+ Peripheral Pulses, No clubbing, cyanosis, or edema      LABS:                        11.5   12.39 )-----------( 256      ( 07 Oct 2021 08:05 )             36.7     07 Oct 2021 08:05    138    |  104    |  12     ----------------------------<  103    4.0     |  27     |  0.81     Ca    9.2        07 Oct 2021 08:05          CAPILLARY BLOOD GLUCOSE          RADIOLOGY & ADDITIONAL TESTS:    Imaging Personally Reviewed:  [ ] YES     Consultant(s) Notes Reviewed:      Care Discussed with Consultants/Other Providers:    Advanced Directives: [ ] DNR  [ ] No feeding tube  [ ] MOLST in chart  [ ] MOLST completed today  [ ] Unknown

## 2021-10-07 NOTE — PROGRESS NOTE ADULT - SUBJECTIVE AND OBJECTIVE BOX
SUBJECTIVE: Patient seen and examined. No nausea/vomiting, nor shortness of breath. Tolerating tramadol for the post op pain medication. Patient is familiar with post op protocol since she had a left TKR done march 2021. Patient this AM is doing well with no other complaints except for lack of sleep.    OBJECTIVE:     Vital Signs Last 24 Hrs  T(C): 36.6 (07 Oct 2021 03:00), Max: 36.7 (06 Oct 2021 15:10)  T(F): 97.8 (07 Oct 2021 03:00), Max: 98 (06 Oct 2021 15:10)  HR: 80 (07 Oct 2021 03:00) (63 - 80)  BP: 135/84 (07 Oct 2021 03:00) (95/43 - 135/84)  BP(mean): --  RR: 14 (07 Oct 2021 03:00) (10 - 16)  SpO2: 97% (07 Oct 2021 03:00) (94% - 100%)    PAIN SCORE:    1     SCALE USED: (1-10 VNRS)        Affected extremity:          Dressing: clean/dry/intact  post operative dressing of the right THR, PLACED NEW abd DRESSINGS OVER THE PRINEO CLOSURE. Surgical incision is intact. No erythema, no reaction to the tape. Mild ecchymosis to be expected otherwise intact. Supple right thigh, not tense.         Sensation:  intact to light touch to bilateral feet, calves are supple and nontender, calves are supple, PAS are on bilateral LE.         Motor exam:          5 / 5 Finger extension/Finger flexion/Hand          5/ 5 Tibialis Anterior/Gastrocnemius-Soleus , EHL         warm well perfused; capillary refill <3 seconds     LABS:  pending              MEDICATIONS:    Anticoagulation:  apixaban 2.5 milliGRAM(s) Oral every 12 hours      Antibiotics: finished      Pain medications:   acetaminophen   Tablet .. 1000 milliGRAM(s) Oral every 8 hours  celecoxib 200 milliGRAM(s) Oral every 12 hours  HYDROmorphone  Injectable 0.5 milliGRAM(s) IV Push every 3 hours  traMADol 50 milliGRAM(s) Oral every 4 hours PRN  traMADol 100 milliGRAM(s) Oral every 6 hours PRN      A/P :  s/p Right TKR  POD # 1  -    Pain control  -    DVT ppx: eliquis  -    Check AM labs  -    Weight bearing status: WBAT   -    Physical Therapy  -    Dispo: Home

## 2021-10-25 ENCOUNTER — APPOINTMENT (OUTPATIENT)
Dept: ORTHOPEDIC SURGERY | Facility: CLINIC | Age: 71
End: 2021-10-25
Payer: MEDICARE

## 2021-10-25 ENCOUNTER — NON-APPOINTMENT (OUTPATIENT)
Age: 71
End: 2021-10-25

## 2021-10-25 VITALS — SYSTOLIC BLOOD PRESSURE: 111 MMHG | DIASTOLIC BLOOD PRESSURE: 70 MMHG | HEART RATE: 84 BPM

## 2021-10-25 PROCEDURE — 99024 POSTOP FOLLOW-UP VISIT: CPT

## 2021-10-25 PROCEDURE — 73562 X-RAY EXAM OF KNEE 3: CPT | Mod: RT

## 2021-10-25 RX ORDER — AMOXICILLIN 500 MG/1
500 CAPSULE ORAL
Qty: 20 | Refills: 4 | Status: ACTIVE | COMMUNITY
Start: 2021-10-25 | End: 1900-01-01

## 2021-10-26 PROCEDURE — 97161 PT EVAL LOW COMPLEX 20 MIN: CPT

## 2021-10-26 PROCEDURE — 97116 GAIT TRAINING THERAPY: CPT

## 2021-10-26 PROCEDURE — 97165 OT EVAL LOW COMPLEX 30 MIN: CPT

## 2021-10-26 PROCEDURE — 86850 RBC ANTIBODY SCREEN: CPT

## 2021-10-26 PROCEDURE — 80048 BASIC METABOLIC PNL TOTAL CA: CPT

## 2021-10-26 PROCEDURE — C1776: CPT

## 2021-10-26 PROCEDURE — 36415 COLL VENOUS BLD VENIPUNCTURE: CPT

## 2021-10-26 PROCEDURE — 94664 DEMO&/EVAL PT USE INHALER: CPT

## 2021-10-26 PROCEDURE — 86769 SARS-COV-2 COVID-19 ANTIBODY: CPT

## 2021-10-26 PROCEDURE — U0003: CPT

## 2021-10-26 PROCEDURE — 27447 TOTAL KNEE ARTHROPLASTY: CPT

## 2021-10-26 PROCEDURE — 97530 THERAPEUTIC ACTIVITIES: CPT

## 2021-10-26 PROCEDURE — 97110 THERAPEUTIC EXERCISES: CPT

## 2021-10-26 PROCEDURE — 97535 SELF CARE MNGMENT TRAINING: CPT

## 2021-10-26 PROCEDURE — C1713: CPT

## 2021-10-26 PROCEDURE — 73560 X-RAY EXAM OF KNEE 1 OR 2: CPT

## 2021-10-26 PROCEDURE — 88305 TISSUE EXAM BY PATHOLOGIST: CPT

## 2021-10-26 PROCEDURE — C1889: CPT

## 2021-10-26 PROCEDURE — U0005: CPT

## 2021-10-26 PROCEDURE — 88311 DECALCIFY TISSUE: CPT

## 2021-10-26 PROCEDURE — 86900 BLOOD TYPING SEROLOGIC ABO: CPT

## 2021-10-26 PROCEDURE — 85027 COMPLETE CBC AUTOMATED: CPT

## 2021-10-26 PROCEDURE — 86901 BLOOD TYPING SEROLOGIC RH(D): CPT

## 2021-10-31 ENCOUNTER — TRANSCRIPTION ENCOUNTER (OUTPATIENT)
Age: 71
End: 2021-10-31

## 2021-11-04 RX ORDER — CELECOXIB 200 MG/1
200 CAPSULE ORAL
Qty: 42 | Refills: 0 | Status: ACTIVE | COMMUNITY
Start: 2021-11-04 | End: 1900-01-01

## 2021-11-19 ENCOUNTER — APPOINTMENT (OUTPATIENT)
Dept: ORTHOPEDIC SURGERY | Facility: CLINIC | Age: 71
End: 2021-11-19
Payer: MEDICARE

## 2021-11-19 VITALS — DIASTOLIC BLOOD PRESSURE: 69 MMHG | SYSTOLIC BLOOD PRESSURE: 114 MMHG | HEART RATE: 81 BPM

## 2021-11-19 PROCEDURE — 99024 POSTOP FOLLOW-UP VISIT: CPT

## 2021-11-19 PROCEDURE — 73562 X-RAY EXAM OF KNEE 3: CPT | Mod: RT

## 2021-11-19 RX ORDER — DICLOFENAC SODIUM 1% 10 MG/G
1 GEL TOPICAL
Qty: 100 | Refills: 0 | Status: ACTIVE | COMMUNITY
Start: 2021-11-19 | End: 1900-01-01

## 2021-11-23 ENCOUNTER — NON-APPOINTMENT (OUTPATIENT)
Age: 71
End: 2021-11-23

## 2021-11-30 ENCOUNTER — NON-APPOINTMENT (OUTPATIENT)
Age: 71
End: 2021-11-30

## 2021-11-30 LAB
BASOPHILS # BLD AUTO: 0.03 K/UL
BASOPHILS NFR BLD AUTO: 0.6 %
CRP SERPL-MCNC: 3 MG/L
EOSINOPHIL # BLD AUTO: 0.16 K/UL
EOSINOPHIL NFR BLD AUTO: 3.2 %
ERYTHROCYTE [SEDIMENTATION RATE] IN BLOOD BY WESTERGREN METHOD: 11 MM/HR
HCT VFR BLD CALC: 39.3 %
HGB BLD-MCNC: 12.1 G/DL
IMM GRANULOCYTES NFR BLD AUTO: 0.2 %
LYMPHOCYTES # BLD AUTO: 1.12 K/UL
LYMPHOCYTES NFR BLD AUTO: 22.4 %
MAN DIFF?: NORMAL
MCHC RBC-ENTMCNC: 27.6 PG
MCHC RBC-ENTMCNC: 30.8 GM/DL
MCV RBC AUTO: 89.7 FL
MONOCYTES # BLD AUTO: 0.43 K/UL
MONOCYTES NFR BLD AUTO: 8.6 %
NEUTROPHILS # BLD AUTO: 3.24 K/UL
NEUTROPHILS NFR BLD AUTO: 65 %
PLATELET # BLD AUTO: 313 K/UL
RBC # BLD: 4.38 M/UL
RBC # FLD: 15.3 %
WBC # FLD AUTO: 4.99 K/UL

## 2021-12-07 ENCOUNTER — APPOINTMENT (OUTPATIENT)
Dept: ORTHOPEDIC SURGERY | Facility: CLINIC | Age: 71
End: 2021-12-07
Payer: MEDICARE

## 2021-12-07 VITALS
HEART RATE: 62 BPM | BODY MASS INDEX: 29.33 KG/M2 | DIASTOLIC BLOOD PRESSURE: 72 MMHG | HEIGHT: 69 IN | SYSTOLIC BLOOD PRESSURE: 112 MMHG | WEIGHT: 198 LBS

## 2021-12-07 DIAGNOSIS — Z96.652 PRESENCE OF LEFT ARTIFICIAL KNEE JOINT: ICD-10-CM

## 2021-12-07 PROCEDURE — 99024 POSTOP FOLLOW-UP VISIT: CPT

## 2022-01-10 ENCOUNTER — RX RENEWAL (OUTPATIENT)
Age: 72
End: 2022-01-10

## 2022-01-10 RX ORDER — DICLOFENAC SODIUM 75 MG/1
75 TABLET, DELAYED RELEASE ORAL
Qty: 60 | Refills: 0 | Status: ACTIVE | COMMUNITY
Start: 2021-11-19 | End: 1900-01-01

## 2023-01-28 ENCOUNTER — OFFICE (OUTPATIENT)
Dept: URBAN - METROPOLITAN AREA CLINIC 6 | Facility: CLINIC | Age: 73
Setting detail: OPHTHALMOLOGY
End: 2023-01-28
Payer: MEDICARE

## 2023-01-28 ENCOUNTER — RX ONLY (RX ONLY)
Age: 73
End: 2023-01-28

## 2023-01-28 DIAGNOSIS — T15.02XA: ICD-10-CM

## 2023-01-28 DIAGNOSIS — H25.13: ICD-10-CM

## 2023-01-28 DIAGNOSIS — H01.002: ICD-10-CM

## 2023-01-28 DIAGNOSIS — H01.001: ICD-10-CM

## 2023-01-28 DIAGNOSIS — H01.004: ICD-10-CM

## 2023-01-28 DIAGNOSIS — H16.223: ICD-10-CM

## 2023-01-28 DIAGNOSIS — H01.005: ICD-10-CM

## 2023-01-28 PROCEDURE — 92002 INTRM OPH EXAM NEW PATIENT: CPT | Performed by: OPHTHALMOLOGY

## 2023-01-28 ASSESSMENT — KERATOMETRY
OS_K1POWER_DIOPTERS: 44.25
OD_K1POWER_DIOPTERS: 43.75
OD_K2POWER_DIOPTERS: 44.50
OD_AXISANGLE_DEGREES: 074
OS_AXISANGLE_DEGREES: 114
OS_K2POWER_DIOPTERS: 44.50

## 2023-01-28 ASSESSMENT — LID EXAM ASSESSMENTS
OD_BLEPHARITIS: RLL RUL
OS_COMMENTS: LID EVERTED, NO FB
OS_BLEPHARITIS: LLL LUL

## 2023-01-28 ASSESSMENT — REFRACTION_AUTOREFRACTION
OS_CYLINDER: -0.50
OD_AXIS: 089
OD_SPHERE: +0.75
OS_SPHERE: +0.25
OD_CYLINDER: -1.25
OS_AXIS: 098

## 2023-01-28 ASSESSMENT — SPHEQUIV_DERIVED
OD_SPHEQUIV: 0.125
OS_SPHEQUIV: 0

## 2023-01-28 ASSESSMENT — AXIALLENGTH_DERIVED
OD_AL: 23.3169
OS_AL: 23.2748

## 2023-01-28 ASSESSMENT — TONOMETRY
OD_IOP_MMHG: 14
OS_IOP_MMHG: 14

## 2023-01-28 ASSESSMENT — CONFRONTATIONAL VISUAL FIELD TEST (CVF)
OD_FINDINGS: FULL
OS_FINDINGS: FULL

## 2023-01-28 ASSESSMENT — SUPERFICIAL PUNCTATE KERATITIS (SPK)
OS_SPK: 1+
OD_SPK: T

## 2023-01-28 ASSESSMENT — VISUAL ACUITY
OS_BCVA: 20/25-1
OD_BCVA: 20/40-

## 2023-03-20 ENCOUNTER — APPOINTMENT (OUTPATIENT)
Dept: ORTHOPEDIC SURGERY | Facility: CLINIC | Age: 73
End: 2023-03-20
Payer: MEDICARE

## 2023-03-20 ENCOUNTER — NON-APPOINTMENT (OUTPATIENT)
Age: 73
End: 2023-03-20

## 2023-03-20 VITALS
HEART RATE: 62 BPM | BODY MASS INDEX: 27.4 KG/M2 | SYSTOLIC BLOOD PRESSURE: 111 MMHG | WEIGHT: 185 LBS | DIASTOLIC BLOOD PRESSURE: 70 MMHG | HEIGHT: 69 IN

## 2023-03-20 DIAGNOSIS — Z96.651 PRESENCE OF RIGHT ARTIFICIAL KNEE JOINT: ICD-10-CM

## 2023-03-20 PROCEDURE — 99213 OFFICE O/P EST LOW 20 MIN: CPT

## 2023-03-20 PROCEDURE — 73562 X-RAY EXAM OF KNEE 3: CPT | Mod: RT

## 2023-03-20 NOTE — PHYSICAL EXAM
[LE] : Sensory: Intact in bilateral lower extremities [DP] : dorsalis pedis 2+ and symmetric bilaterally [PT] : posterior tibial 2+ and symmetric bilaterally [Normal RLE] : Right Lower Extremity: No scars, rashes, lesions, ulcers, skin intact [Normal LLE] : Left Lower Extremity: No scars, rashes, lesions, ulcers, skin intact [Normal Touch] : sensation intact for touch [Normal] : no peripheral adenopathy appreciated [de-identified] : The wound is well-healed, neurovascular status is intact. Good sensation and pulses distally good strength against resistance and EHL and dorsiflexion.\par There is no anterior drawer. There is no extensor lag or AP laxity. There is no significant mediolateral instability. Extension is the midline. Calf is nontender\par The range of motion is 0-110\par  [de-identified] : Radiographs of the [RT   ] knee were performed today. There are stable interfaces between bone, cement, and implant in all 3 components. There is stable positioning of the femoral, tibial, and patellar components. There is equidistant spacing on each side of the tibial bearing. There is no fracture, foreign body, subsidement, osteolysis, or notable effusion. The patellar component is tracking centrally in the trochlear groove of the femoral component.\par \par

## 2023-03-20 NOTE — REASON FOR VISIT
[Follow-Up Visit] : a follow-up visit for [Artificial Knee Joint] : an artificial knee joint [Knee Pain] : knee pain [Spouse] : spouse [FreeTextEntry2] : Right total knee replacement 10/6/2021

## 2023-03-20 NOTE — HISTORY OF PRESENT ILLNESS
[Improving] : improving [2] : a current pain level of 2/10 [Standing] : standing [Walking] : worsened by walking [Knee Flexion] : worsened with knee flexion [Knee Extension] : worsened with knee extension [de-identified] : Patient is status post a right total knee replacement October 2021 has been having some right knee discomfort after a specifically strenuous walk patient denies any trauma to the knee.  Patient states that pain was about a 9 last week is about a 2 today she is using a very occasional Advil

## 2023-03-20 NOTE — DISCUSSION/SUMMARY
[de-identified] : Patient was advised a more routine schedule for the anti-inflammatory recommending 3 times a day along with Tylenol on a as needed basis patient was advised ice after activities and to resume isometric and straight leg raising exercises and knee extensions with a light weight on the ankle patient was advised that there is a minor amount of laxity in the medial lateral aspect of the knee that should improve with exercise and rest and that she probably strained the knee on her significant walk.  Patient will follow-up with us in the future if she has no significant relief

## 2023-06-19 NOTE — PATIENT PROFILE ADULT - INFLUENZA IMMUNIZATION DATE (APPROXIMATE)
Hany Rodriguez from [address] [place] [address] stating insulin pen medication for patient is not covered by insurance.  Please advise 09-Dec-2019

## 2023-09-21 ASSESSMENT — KOOS JR
GOING UP OR DOWN STAIRS: MILD
TWISING OR PIVOTING ON KNEE: MODERATE
KOOS JR RAW SCORE: 8
BENDING TO THE FLOOR TO PICK UP OBJECT: MILD
STRAIGHTENING KNEE FULLY: MILD
HOW SEVERE IS YOUR KNEE STIFFNESS AFTER FIRST WAKING IN MORNING: MODERATE
RISING FROM SITTING: MILD
IMPORTED KOOS JR SCORE: 8.0
IMPORTED FORM: YES

## 2025-09-19 ENCOUNTER — TRANSCRIPTION ENCOUNTER (OUTPATIENT)
Age: 75
End: 2025-09-19